# Patient Record
Sex: FEMALE | Race: WHITE | Employment: FULL TIME | URBAN - METROPOLITAN AREA
[De-identification: names, ages, dates, MRNs, and addresses within clinical notes are randomized per-mention and may not be internally consistent; named-entity substitution may affect disease eponyms.]

---

## 2017-07-10 ENCOUNTER — OFFICE VISIT (OUTPATIENT)
Dept: OBGYN CLINIC | Age: 22
End: 2017-07-10

## 2017-07-10 DIAGNOSIS — N89.8 VAGINAL ODOR: ICD-10-CM

## 2017-07-10 DIAGNOSIS — Z34.81 SUPERVISION OF NORMAL INTRAUTERINE PREGNANCY IN MULTIGRAVIDA IN FIRST TRIMESTER: Primary | ICD-10-CM

## 2017-07-10 DIAGNOSIS — O36.80X0 ENCOUNTER TO DETERMINE FETAL VIABILITY OF PREGNANCY, NOT APPLICABLE OR UNSPECIFIED FETUS: Primary | ICD-10-CM

## 2017-07-10 LAB
ANTIBODY SCREEN, EXTERNAL: NEGATIVE
CHLAMYDIA, EXTERNAL: NEGATIVE
CYSTIC FIBROSIS, EXTERNAL: NEGATIVE
HBSAG, EXTERNAL: NEGATIVE
HCT, EXTERNAL: 35.1
HGB, EXTERNAL: 11.1
HIV, EXTERNAL: NORMAL
N. GONORRHEA, EXTERNAL: NEGATIVE
PAP SMEAR, EXTERNAL: NEGATIVE
PLATELET CNT,   EXTERNAL: 303
RUBELLA, EXTERNAL: NORMAL
T. PALLIDUM, EXTERNAL: NEGATIVE
TSH SERPL-ACNC: 1.36 M[IU]/L
URINALYSIS, EXTERNAL: NEGATIVE

## 2017-07-10 RX ORDER — RANITIDINE 150 MG/1
150 TABLET, FILM COATED ORAL 2 TIMES DAILY
COMMUNITY
End: 2020-07-14

## 2017-07-10 RX ORDER — BUTALBITAL, ACETAMINOPHEN AND CAFFEINE 50; 325; 40 MG/1; MG/1; MG/1
1 TABLET ORAL
Qty: 20 TAB | Refills: 2 | Status: SHIPPED | OUTPATIENT
Start: 2017-07-10 | End: 2020-07-14

## 2017-07-10 RX ORDER — ACETAMINOPHEN 325 MG/1
TABLET ORAL
COMMUNITY

## 2017-07-10 RX ORDER — CALCIUM CARBONATE 200(500)MG
1 TABLET,CHEWABLE ORAL DAILY
COMMUNITY

## 2017-07-10 NOTE — MR AVS SNAPSHOT
Visit Information Date & Time Provider Department Dept. Phone Encounter #  
 7/10/2017 10:00 AM Betty Call [address] OB-GYN [phone] [id number] Upcoming Health Maintenance Date Due  
 HPV AGE 9Y-34Y (1 of 3 - Female 3 Dose Series) 4/16/2005 PAP AKA CERVICAL CYTOLOGY 4/16/2015 INFLUENZA AGE 9 TO ADULT 8/1/2017 Allergies as of 7/10/2017  Review Complete On: 7/10/2017 By: Jake Cosme MD  
 No Known Allergies Current Immunizations  Never Reviewed Name Date MMR 8/8/2014 11:41 AM  
  
 Not reviewed this visit Vitals LMP OB Status Smoking Status 05/17/2017 (Exact Date) Having regular periods Current Every Day Smoker Preferred Pharmacy Pharmacy Name Phone [address] [facility] AT [address] [place] [address]. [phone] Your Updated Medication List  
  
   
This list is accurate as of: 7/10/17 12:21 PM.  Always use your most recent med list.  
  
  
  
  
 amitriptyline 25 mg tablet Commonly known as:  ELAVIL Take 1 tablet by mouth nightly. calcium carbonate 200 mg calcium (500 mg) Chew Commonly known as:  TUMS Take 1 Tab by mouth daily. Diclofenac Potassium 50 mg Pwpk Commonly known as:  CAMBIA  
1 at HA onset and repeat in 2 hours x 1 prn  Max 2 in 24 hours  
  
 ibuprofen 800 mg tablet Commonly known as:  MOTRIN Take 1 Tab by mouth every eight (8) hours as needed for Pain.  
  
 levothyroxine 25 mcg tablet Commonly known as:  SYNTHROID Take  by mouth Daily (before breakfast). magnesium oxide 400 mg tablet Commonly known as:  MAG-OX Take 1 Tab by mouth two (2) times a day. PNV66-Iron Fumarate-FA-DSS-DHA 26-1.2- mg Cap Take  by mouth. TYLENOL 325 mg tablet Generic drug:  acetaminophen Take  by mouth every four (4) hours as needed for Pain. ZANTAC 150 mg tablet Generic drug:  raNITIdine Take 150 mg by mouth two (2) times a day. Patient Instructions Weeks 6 to 10 of Your Pregnancy: Care Instructions Your Care Instructions Congratulations on your pregnancy. This is an exciting and important time for you. During the first 6 to 10 weeks of your pregnancy, your body goes through many changes. Your baby grows very fast, even though you cannot feel it yet. You may start to notice that you feel different, both in your body and your emotions. Because each woman's pregnancy is unique, there is no right way to feel. You may feel the healthiest you have ever been, or you may feel tired or sick to your stomach (\"morning sickness\"). These early weeks are a time to make healthy choices and to eat the best foods for you and your baby. This care sheet will give you some ideas. This is also a good time to think about birth defects testing. These are tests done during pregnancy to look for possible problems with the baby. First trimester tests for birth defects can be done between 8 and 17 weeks of pregnancy, depending on the test. Talk with your doctor about what kinds of tests are available. Follow-up care is a key part of your treatment and safety. Be sure to make and go to all appointments, and call your doctor if you are having problems. It's also a good idea to know your test results and keep a list of the medicines you take. How can you care for yourself at home? Eat well · Eat at least 3 meals and 2 healthy snacks every day. Eat fresh, whole foods, including: ¨ 7 or more servings of bread, tortillas, cereal, rice, pasta, or oatmeal. 
¨ 3 or more servings of vegetables, especially leafy green vegetables. ¨ 2 or more servings of fruits. ¨ 3 or more servings of milk, yogurt, or cheese. ¨ 2 or more servings of meat, turkey, chicken, fish, eggs, or dried beans. · Drink plenty of fluids, especially water. Avoid sodas and other sweetened drinks. · Choose foods that have important vitamins for your baby, such as calcium, iron, and folate. ¨ Dairy products, tofu, canned fish with bones, almonds, broccoli, dark leafy greens, corn tortillas, and fortified orange juice are good sources of calcium. ¨ Beef, poultry, liver, spinach, lentils, dried beans, fortified cereals, and dried fruits are rich in iron. ¨ Dark leafy greens, broccoli, asparagus, liver, fortified cereals, orange juice, peanuts, and almonds are good sources of folate. · Avoid foods that could harm your baby. ¨ Do not eat raw or undercooked meat, chicken, or fish (such as sushi or raw oysters). ¨ Do not eat raw eggs or foods that contain raw eggs, such as Caesar dressing. ¨ Do not eat soft cheeses and unpasteurized dairy foods, such as Brie, feta, or blue cheese. ¨ Do not eat fish that contains a lot of mercury, such as shark, swordfish, tilefish, or frank mackerel. Do not eat more than 6 ounces of tuna each week. ¨ Do not eat raw sprouts, especially alfalfa sprouts. ¨ Cut down on caffeine, such as coffee, tea, and cola. Protect yourself and your baby · Do not touch rosalind litter or cat feces. They can cause an infection that could harm your baby. · High body temperature can be harmful to your baby. So if you want to use a sauna or hot tub, be sure to talk to your doctor about how to use it safely. [place] with morning sickness · Sip small amounts of water, juices, or shakes. Try drinking between meals, not with meals. · Eat 5 or 6 small meals a day. Try dry toast or crackers when you first get up, and eat breakfast a little later. · Avoid spicy, greasy, and fatty foods. · When you feel sick, open your windows or go for a short walk to get fresh air. · Try nausea wristbands. These help some women. · Tell your doctor if you think your prenatal vitamins make you sick. Where can you learn more? Go to http://cortney-williams.info/. Enter G112 in the search box to learn more about \"Weeks 6 to 10 of Your Pregnancy: Care Instructions. \" Current as of: March 16, 2017 Content Version: 11.3 © 2392-4406 [employer]. Care instructions adapted under license by [employer]ango (which disclaims liability or warranty for this information). If you have questions about a medical condition or this instruction, always ask your healthcare professional. [place] [address] any warranty or liability for your use of this information. Introducing [facility]! [employer] introduces Cordia patient portal. Now you can access parts of your medical record, email your doctor's office, and request medication refills online. 1. In your internet browser, go to [email or web address] 2. Click on the First Time User? Click Here link in the Sign In box. You will see the New Member Sign Up page. 3. Enter your Cordia Access Code exactly as it appears below. You will not need to use this code after youve completed the sign-up process. If you do not sign up before the expiration date, you must request a new code. · Cordia Access Code: [email or web address] Expires: 10/8/2017 12:21 PM 
 
4. Enter the last four digits of your Social Security Number (xxxx) and Date of Birth (mm/dd/yyyy) as indicated and click Submit. You will be taken to the next sign-up page. 5. Create a Cordia ID. This will be your Cordia login ID and cannot be changed, so think of one that is secure and easy to remember. 6. Create a Cordia password. You can change your password at any time. 7. Enter your Password Reset Question and Answer. This can be used at a later time if you forget your password. 8. Enter your e-mail address. You will receive e-mail notification when new information is available in [address] 9. Click Sign Up. You can now view and download portions of your medical record. 10. Click the Download Summary menu link to download a portable copy of your medical information. If you have questions, please visit the Frequently Asked Questions section of the [employer] website. Remember, [employer] is NOT to be used for urgent needs. For medical emergencies, dial 911. Now available from your iPhone and Android! Please provide this summary of care documentation to your next provider. If you have any questions after today's visit, please call [phone].

## 2017-07-10 NOTE — PATIENT INSTRUCTIONS
Weeks 6 to 10 of Your Pregnancy: Care Instructions  Your Care Instructions    Congratulations on your pregnancy. This is an exciting and important time for you. During the first 6 to 10 weeks of your pregnancy, your body goes through many changes. Your baby grows very fast, even though you cannot feel it yet. You may start to notice that you feel different, both in your body and your emotions. Because each woman's pregnancy is unique, there is no right way to feel. You may feel the healthiest you have ever been, or you may feel tired or sick to your stomach (\"morning sickness\"). These early weeks are a time to make healthy choices and to eat the best foods for you and your baby. This care sheet will give you some ideas. This is also a good time to think about birth defects testing. These are tests done during pregnancy to look for possible problems with the baby. First trimester tests for birth defects can be done between 8 and 17 weeks of pregnancy, depending on the test. Talk with your doctor about what kinds of tests are available. Follow-up care is a key part of your treatment and safety. Be sure to make and go to all appointments, and call your doctor if you are having problems. It's also a good idea to know your test results and keep a list of the medicines you take. How can you care for yourself at home? Eat well  · Eat at least 3 meals and 2 healthy snacks every day. Eat fresh, whole foods, including:  ¨ 7 or more servings of bread, tortillas, cereal, rice, pasta, or oatmeal.  ¨ 3 or more servings of vegetables, especially leafy green vegetables. ¨ 2 or more servings of fruits. ¨ 3 or more servings of milk, yogurt, or cheese. ¨ 2 or more servings of meat, turkey, chicken, fish, eggs, or dried beans. · Drink plenty of fluids, especially water. Avoid sodas and other sweetened drinks. · Choose foods that have important vitamins for your baby, such as calcium, iron, and folate.   ¨ Dairy products, tofu, canned fish with bones, almonds, broccoli, dark leafy greens, corn tortillas, and fortified orange juice are good sources of calcium. ¨ Beef, poultry, liver, spinach, lentils, dried beans, fortified cereals, and dried fruits are rich in iron. ¨ Dark leafy greens, broccoli, asparagus, liver, fortified cereals, orange juice, peanuts, and almonds are good sources of folate. · Avoid foods that could harm your baby. ¨ Do not eat raw or undercooked meat, chicken, or fish (such as sushi or raw oysters). ¨ Do not eat raw eggs or foods that contain raw eggs, such as Caesar dressing. ¨ Do not eat soft cheeses and unpasteurized dairy foods, such as Brie, feta, or blue cheese. ¨ Do not eat fish that contains a lot of mercury, such as shark, swordfish, tilefish, or frank mackerel. Do not eat more than 6 ounces of tuna each week. ¨ Do not eat raw sprouts, especially alfalfa sprouts. ¨ Cut down on caffeine, such as coffee, tea, and cola. Protect yourself and your baby  · Do not touch rosalind litter or cat feces. They can cause an infection that could harm your baby. · High body temperature can be harmful to your baby. So if you want to use a sauna or hot tub, be sure to talk to your doctor about how to use it safely. [place] with morning sickness  · Sip small amounts of water, juices, or shakes. Try drinking between meals, not with meals. · Eat 5 or 6 small meals a day. Try dry toast or crackers when you first get up, and eat breakfast a little later. · Avoid spicy, greasy, and fatty foods. · When you feel sick, open your windows or go for a short walk to get fresh air. · Try nausea wristbands. These help some women. · Tell your doctor if you think your prenatal vitamins make you sick. Where can you learn more? Go to http://olivier.info/. Enter G112 in the search box to learn more about \"Weeks 6 to 10 of Your Pregnancy: Care Instructions. \"  Current as of: March 16, 2017  Content Version: 11.3  © 1714-7014 [employer]. Care instructions adapted under license by [employer] (which disclaims liability or warranty for this information). If you have questions about a medical condition or this instruction, always ask your healthcare professional. [address] any warranty or liability for your use of this information.

## 2017-07-10 NOTE — PROGRESS NOTES
TV ULTRASOUND PERFORMED  A SINGLE VIABLE 7W3D WITH GARY OF 2018 IUP IS SEEN WITH NORMAL CARDIAC RHYTHM. GESTATIONAL AGE BASED ON ULTRASOUND. A NORMAL YOLK SAC IS SEEN. RIGHT OVARY APPEAR WITHIN NORMAL LIMITS. LEFT OVARY APPEARS WITHING NORMAL LIMITS. NO FREE FLUID IS SEEN IN THE CDS. Initial OB Visit    Roger Moeller is a 20 y.o.  at 7 weeks 3 days by 7 wk RIGOBERTO ultrasound for initial OB visit, overall doing well. +fatigue, mild N/V, occasional palpitations, occasional mild cramping. Otherwise doing well. Pt is smoker. Boyfriend with patient today, pt works as , ~3 yo daughter \"Rashaad\"    Pregnancy symptoms:  -N/V: yes, mild  -breast tenderness: yes  -fatigue: yes  -cramping: yes, mild  -weight change: yes (lost a few pounds), decreased appetite, increased activity  -Vaginal bleeding: no  -Fetal Movement: n/a    Ob/Gyn Hx:  G 2 P1- tsvd x1, Anthony Sa, female \"Rashaad\"  EDC- 18  LMP- Date: 17 unsure, approximate  Menstrual pattern prior to conception: Regular monthly  Contraception at time of conception: denies  Date of 1st positive UPT: 17  STI- ?trichomonas in last pregnancy  ? SA- male partner    Health maintenance:  Pap- unsure of date, normal  Gardasil- three doses received  Mammo-n/a    Substance history: negative for alcohol, and street drugs. +smoker (cut back to 8 cigarettes per day)  +THC          Exposure history: There ARE cats in the home. The patient was instructed to not change the cat litter. She admits close contact with children on a regular basis. She has had chicken pox or the vaccine in the past.   Patient denies issues with domestic violence. Genetic Screening/Teratology Counseling: (Includes patient, baby's father, or anyone in either family with:)  3.  Patient's age >/= 28 at [facility]?-- no  .   2.   Thalassemia ([facility] [place], [address], or  background): MCV<80?--no.     3.  Neural tube defect (meningomyelocele, spina bifida, anencephaly)?--no.   4.  Congenital heart defect?--YES - VSD (patient)  5. Down syndrome?--no.   6.  Gonsalo-Sachs ([place], [place])?--no.   7.  Canavan's Disease?--no.   8.  Familial Dysautonomia?--no.   9.  Sickle cell disease or trait ()? --no   The patient has not been tested for sickle trait  10. Hemophilia or other blood disorders?--no. 11.  Muscular dystrophy?--no. 12.  Cystic fibrosis?--no. 13.  Hamburg's Chorea?--no. 14.  Mental retardation/autism (if yes was person tested for Fragile X)?--no. 15.  Other inherited genetic or chromosomal disorder?--no. 12.  Maternal metabolic disorder (DM, PKU, etc)?--no. 17.  Patient or FOB with a child with a birth defect not listed above?--no.  17a. Patient or FOB with a birth defect themselves?--no. 18.  Recurrent pregnancy loss, or stillbirth?--no. 19.  Any medications since LMP other than prenatal vitamins (include vitamins, supplements, OTC meds, drugs, alcohol)?--no. 20.  Any other genetic/environmental exposure to discuss?--no. Infection History:  1. Lives with someone with TB or TB exposed?--no.   2.  Patient or partner has history of genital herpes?--no.  3.  Rash or viral illness since LMP?--no.    4.  History of STD (GC, CT, HPV, syphilis, HIV)? --no   5. Other: OTHER?   No travel to [facility]    Past Medical History:   Diagnosis Date    Abnormal Pap smear     2013    Anxiety     Fatigue     Heart abnormality     Memory loss     Neurological disorder     migraines    Other ill-defined conditions     Bladder reflux    Ringing in ears     Thyroid activity decreased     Thyroid disease     hypothyroidism in first pregnancy    VSD (ventricular septal defect)      Past Surgical History:   Procedure Laterality Date    CARDIAC SURG PROCEDURE UNLIST      VSD repair       Family History   Problem Relation Age of Onset    Cancer Maternal Grandmother      lung cancer    Diabetes Maternal Grandmother     Hypertension Maternal Grandmother     Breast Cancer Other      Social History     Social History    Marital status: SINGLE     Spouse name: N/A    Number of children: N/A    Years of education: N/A     Occupational History    Not on file. Social History Main Topics    Smoking status: Current Every Day Smoker     Packs/day: 0.25    Smokeless tobacco: Current User    Alcohol use No    Drug use: No    Sexual activity: Yes     Partners: Male     Other Topics Concern    Not on file     Social History Narrative    ** Merged History Encounter **            Current Outpatient Prescriptions   Medication Sig Dispense Refill    acetaminophen (TYLENOL) 325 mg tablet Take  by mouth every four (4) hours as needed for Pain.  raNITIdine (ZANTAC) 150 mg tablet Take 150 mg by mouth two (2) times a day.  calcium carbonate (TUMS) 200 mg calcium (500 mg) chew Take 1 Tab by mouth daily.  PNV66-Iron Fumarate-FA-DSS-DHA 26-1.2- mg cap Take  by mouth.  Diclofenac Potassium (CAMBIA) 50 mg pwpk 1 at HA onset and repeat in 2 hours x 1 prn  Max 2 in 24 hours 4 packet 3    amitriptyline (ELAVIL) 25 mg tablet Take 1 tablet by mouth nightly. 30 tablet 3    ibuprofen (MOTRIN) 800 mg tablet Take 1 Tab by mouth every eight (8) hours as needed for Pain. 30 Tab 1    levothyroxine (SYNTHROID) 25 mcg tablet Take  by mouth Daily (before breakfast).  magnesium oxide (MAG-OX) 400 mg tablet Take 1 Tab by mouth two (2) times a day.  60 Tab 3     No Known Allergies      Review of Systems - History obtained from the patient  Constitutional: negative for fever, night sweats, +3lb wt loss, +fatigue  HEENT: negative for hearing loss, earache, congestion, snoring, sorethroat  CV: negative for chest pain, palpitations, edema, +occasional palpitations/heart racing  Resp: negative for cough, shortness of breath, wheezing  GI: negative for change in bowel habits, abdominal pain, black or bloody stools, +mild N/V, decreased appetite  : negative for frequency, dysuria, hematuria, vaginal discharge, +mild cramping  MSK: negative for back pain, joint pain, muscle pain  Breast: negative for breast lumps, nipple discharge, galactorrhea  Skin :negative for itching, rash, hives  Neuro: negative for dizziness, confusion, weakness, +intermittent HAs  Psych: negative for anxiety, depression, change in mood  Heme/lymph: negative for bleeding, bruising, pallor    Physical Exam    Visit Vitals    /68 (BP 1 Location: Right arm, BP Patient Position: Sitting)    Resp 18    Ht 5' 1\" (1.549 m)    Wt 127 lb (57.6 kg)    LMP 05/17/2017 (Exact Date)    BMI 24 kg/m2       Constitutional  · Appearance: well-nourished, well developed, alert, in no acute distress, smells of smoke    HENT  · Head and Face: appears normal    Neck  · Inspection/Palpation: normal appearance, no masses or tenderness  · Lymph Nodes: no lymphadenopathy present  · Thyroid: gland size normal, nontender, no nodules or masses present on palpation    Chest  · Respiratory Effort: non-labored breathing  · Auscultation: CTAB, normal breath sounds    Cardiovascular  · Heart:  · Auscultation: regular rate and rhythm, ?murmur, well-healed midsternal scar from prior VSD repair at age 9mo  · Extremities: no peripheral edema    Breasts  · Inspection of Breasts: breasts symmetrical, no skin changes, no discharge present, nipple appearance normal, no skin retraction present  · Palpation of Breasts and Axillae: no masses present on palpation, no breast tenderness  · Axillary Lymph Nodes: no lymphadenopathy present    Gastrointestinal  · Abdominal Examination: abdomen non-tender to palpation, normal bowel sounds, no masses present  · Liver and spleen: no hepatomegaly present, spleen not palpable  · Hernias: no hernias identified    Genitourinary  · External Genitalia: normal appearance for age, no discharge present, no tenderness present, no inflammatory lesions present, no masses present, no atrophy present  · Vagina: normal vaginal vault without central or paravaginal defects, no inflammatory lesions present, no masses present, +moderate amount of frothy gray malodorous vaginal discharge  · Bladder: non-tender to palpation  · Urethra: appears normal  · Cervix: normal, no cervicitis, no CMT  · Uterus: approximately 7 week sized, non-tender  · Adnexa: no adnexal tenderness present, no appreciable adnexal masses present  · Perineum: perineum within normal limits, no evidence of trauma, no rashes or skin lesions present    Skin  · General Inspection: no rash, no lesions identified    Neurologic/Psychiatric  · Mental Status:  · Orientation: grossly oriented to person, place and time  · Mood and Affect: mood normal, affect appropriate      Assessment/Plan:  20 y.o.  at 7w3d by 7 wk RIGOBERTO ultrasound presenting for initial OB visit. Overall doing well. IUP: confirmed on ultrasound today, FHTs 150s, EDC 17  -anatomy scan 18-20 wks  -daily PNV   -flu vaccine next flu season  -tdap at 28 wks  -counseled on nutrition and proper weight gain in pregnancy as well as foods to avoid  -discussed other high yield topics including appropriate exercise levels, sexual activity, toxoplasmosis precautions, toxin avoidance, travel advice (including zika travel warnings)  -screen for DV neg    Genetic screening/diagnostic testing:  -counselled on screening options including CF, SMA, fragile X testing, 1st trimester screen/NT --> MSAFP, 2nd trimester tetra screen, NIPT, CVS, amniocentesis, etc.  -desires CF testing  -considering options for NIPT/1st tri/tetra, will read more about this and decide at next visit (had a friend with baby affected by trisomy 15)    PNL: A pos  -new OB labs today  -pap today  -Nuswab Plus (given malodorous frothy vaginal discharge)  -urine cx     PMH:  -mild N/V of pregnancy - advise small freq meals, take PNV with food later in day, jordan, etc. Unisom/B6 prn.    -GERD - tums, zantac prn  -h/o VSD - surgically repaired at age 6mo, no current issues (occsaional heart racing/palpitations, but denies CP, SOB or other symptoms), was previously on metoprolol for irregularly fast heart beat but has been off of this for many years, echocardiograms have been wnl, was following with [facility] --> recommended follow up with her cardiologist --> will need to request records  -hypothyroid - in prior pregnancy only, recently had thyroid function checked within last 6 months and was normal - will check TSH today  -migraines - since 2007, intermittent HAs this pregnancy, tylenol prn, previously on fioricet, ran out of Rx, [facility]ill [facility] [address]), she is off of diclofenac/ibuprofen/amitriptyline which she has taken outside of pregnancy)  -mild anxiety - stable, no current meds  -substance abuse - tobacco, \"vaping\", THC --> counseled on cessation and risks to baby (growth restriction, PTL, etc)    PP plans:  -feeding? breast feeding, tried with last pregnancy x 2 weeks, low production, but wants to try again  -contraception? tbd  -circ (if male)?  tbd - wants to find out gender  -NCB desired, epidural with last pegnancy, would like to avoid this time    RTC: 1 month, or sooner prn for problems or concerns  -cramping, pain, bleeding precautions reviewed  -handouts and instructions provided    Shyam Singh MD  7/10/2017  12:17 PM

## 2017-07-11 VITALS
WEIGHT: 127 LBS | HEIGHT: 61 IN | SYSTOLIC BLOOD PRESSURE: 110 MMHG | BODY MASS INDEX: 23.98 KG/M2 | DIASTOLIC BLOOD PRESSURE: 68 MMHG | RESPIRATION RATE: 18 BRPM

## 2017-07-11 LAB
CYTOLOGIST CVX/VAG CYTO: NORMAL
CYTOLOGY CVX/VAG DOC THIN PREP: NORMAL
DX ICD CODE: NORMAL
LABCORP, 190119: NORMAL
Lab: NORMAL
OTHER STN SPEC: NORMAL
PATH REPORT.FINAL DX SPEC: NORMAL
STAT OF ADQ CVX/VAG CYTO-IMP: NORMAL

## 2017-07-12 LAB
ABO GROUP BLD: NORMAL
BACTERIA UR CULT: NORMAL
BLD GP AB SCN SERPL QL: NEGATIVE
ERYTHROCYTE [DISTWIDTH] IN BLOOD BY AUTOMATED COUNT: 13.9 % (ref 12.3–15.4)
HBV SURFACE AG SERPL QL IA: NEGATIVE
HCT VFR BLD AUTO: 35.1 % (ref 34–46.6)
HGB A MFR BLD: 97.6 % (ref 94–98)
HGB A2 MFR BLD COLUMN CHROM: 2.4 % (ref 0.7–3.1)
HGB BLD-MCNC: 11.1 G/DL (ref 11.1–15.9)
HGB C MFR BLD: 0 %
HGB F MFR BLD: 0 % (ref 0–2)
HGB FRACT BLD-IMP: NORMAL
HGB S BLD QL SOLY: NEGATIVE
HGB S MFR BLD: 0 %
HIV 1+2 AB+HIV1 P24 AG SERPL QL IA: NON REACTIVE
MCH RBC QN AUTO: 27.9 PG (ref 26.6–33)
MCHC RBC AUTO-ENTMCNC: 31.6 G/DL (ref 31.5–35.7)
MCV RBC AUTO: 88 FL (ref 79–97)
PLATELET # BLD AUTO: 303 X10E3/UL (ref 150–379)
RBC # BLD AUTO: 3.98 X10E6/UL (ref 3.77–5.28)
RH BLD: POSITIVE
RUBV IGG SERPL IA-ACNC: 1.31 INDEX
T PALLIDUM AB SER QL IA: NEGATIVE
T4 FREE SERPL-MCNC: 0.91 NG/DL (ref 0.82–1.77)
TSH SERPL DL<=0.005 MIU/L-ACNC: 1.36 UIU/ML (ref 0.45–4.5)
WBC # BLD AUTO: 6.6 X10E3/UL (ref 3.4–10.8)

## 2017-07-13 LAB
A VAGINAE DNA VAG QL NAA+PROBE: ABNORMAL SCORE
BVAB2 DNA VAG QL NAA+PROBE: ABNORMAL SCORE
C ALBICANS DNA VAG QL NAA+PROBE: NEGATIVE
C GLABRATA DNA VAG QL NAA+PROBE: NEGATIVE
C TRACH RRNA SPEC QL NAA+PROBE: NEGATIVE
CFTR MUT ANL BLD/T: NORMAL
GENE DIS ANL CARRIER INTERP-IMP: NORMAL
MEGA1 DNA VAG QL NAA+PROBE: ABNORMAL SCORE
N GONORRHOEA RRNA SPEC QL NAA+PROBE: NEGATIVE
T VAGINALIS RRNA SPEC QL NAA+PROBE: NEGATIVE

## 2017-07-13 RX ORDER — METRONIDAZOLE 500 MG/1
500 TABLET ORAL 2 TIMES DAILY
Qty: 14 TAB | Refills: 0 | Status: SHIPPED | OUTPATIENT
Start: 2017-07-13 | End: 2017-07-20

## 2017-07-13 NOTE — PROGRESS NOTES
+BV --> please inform patient and send Rx for flagyl 500mg BID x 7 days to pts pharmacy. Thanks.     Orvis Osgood, MD  7/13/2017  11:20 AM

## 2017-07-13 NOTE — PROGRESS NOTES
Patient informed of results and that Flagyl will be sent to pharmacy for treatment; patient verbalized understanding.

## 2017-07-21 RX ORDER — METRONIDAZOLE 500 MG/1
500 TABLET ORAL 2 TIMES DAILY
Qty: 14 TAB | Refills: 0 | Status: SHIPPED | OUTPATIENT
Start: 2017-07-21 | End: 2017-07-28

## 2017-08-07 ENCOUNTER — ROUTINE PRENATAL (OUTPATIENT)
Dept: OBGYN CLINIC | Age: 22
End: 2017-08-07

## 2017-08-07 VITALS
BODY MASS INDEX: 24.39 KG/M2 | HEIGHT: 61 IN | RESPIRATION RATE: 18 BRPM | WEIGHT: 129.2 LBS | SYSTOLIC BLOOD PRESSURE: 114 MMHG | DIASTOLIC BLOOD PRESSURE: 60 MMHG

## 2017-08-07 DIAGNOSIS — Q21.0 VSD (VENTRICULAR SEPTAL DEFECT): ICD-10-CM

## 2017-08-07 DIAGNOSIS — Z3A.11 11 WEEKS GESTATION OF PREGNANCY: Primary | ICD-10-CM

## 2017-08-07 NOTE — PROGRESS NOTES
19 yo  at [address] by 7 wk RIGOBERTO ultrasound presenting for Silke Isaac 9038 visit.      The following concerns today:  -frequent headaches - minimal relief with fioricet  -Nausea and vomiting (1-2x daily), weight stable  -Occasional mild cramping, no vaginal bleeding    IUP: +FHTs  -anatomy scan 18-20 wks  -daily PNV   -flu vaccine next flu season   -tdap at 28 wks  -reviewed pain/bleeding precautions     Genetic screening/diagnostic testing:  -counselled on screening options including CF, SMA, fragile X testing, 1st trimester screen/NT --> MSAFP, 2nd trimester tetra screen, NIPT, CVS, amniocentesis, etc.  -desires [facility] --> order form and box provided today (had a friend with baby affected by trisomy 15)     PNL: A pos / ABSC neg / AA / 11.1, 303 / HIV, hepB, syph, rub, GC, Chl all neg / pap NILM 2017 / CF neg / UG varghese      PMH:  -mild N/V of pregnancy - advise small freq meals, take PNV with food later in day, jordan, etc. Advised pt start Unisom/B6.   -GERD - tums, zantac prn  -h/o VSD - surgically repaired at age 6mo, no current issues (occsaional heart racing/palpitations, but denies CP, SOB or other symptoms), was previously on metoprolol for irregularly fast heart beat but has been off of this for many years, echocardiograms have been wnl, was following with [facility] --> recommended follow up with her cardiologist (new referral provided today, as insurance changed)--> will need to request records  -hypothyroid - in prior pregnancy only, TFTs wnl in current pregnancy (TSH 1.36, free T4 0.91)  -migraines - since , intermittent HAs this pregnancy, tylenol prn, previously on fioricet, ran out of Rx, [facility] [address] she is off of diclofenac/ibuprofen/amitriptyline which she has taken outside of pregnancy), is unable to take imitrex due to stroke like symptoms while taking this medication, advised pt take 2 fioricets for severe headaches prn if no relief from 1, encouraged good hydration  -mild anxiety - stable, no current meds  -substance abuse - tobacco, \"vaping\", THC --> counseled on cessation and risks to baby (growth restriction, PTL, etc)  -s/p tx for BV     PP plans:  -feeding? breast feeding, tried with last pregnancy x 2 weeks, low production, but wants to try again  -contraception? tbd  -circ (if male)?  tbd - wants to find out gender  -NCB desired, epidural with last pegnancy, would like to avoid this time     RTC: 1 month, or sooner prtamika Lemons MD  8/7/2017  12:10 PM

## 2017-08-07 NOTE — PROGRESS NOTES
Chief Complaint   Patient presents with    Routine Prenatal Visit     Patient complains of migraines unrelieved by Fioricet, nausea and vomiting (1-2 times daily), and sporadic abdominal cramping (especially prior to voiding); she states she stays well hydrated and denies other complaints.

## 2017-08-09 ENCOUNTER — TELEPHONE (OUTPATIENT)
Dept: OBGYN CLINIC | Age: 22
End: 2017-08-09

## 2017-09-05 ENCOUNTER — ROUTINE PRENATAL (OUTPATIENT)
Dept: OBGYN CLINIC | Age: 22
End: 2017-09-05

## 2017-09-05 VITALS
RESPIRATION RATE: 19 BRPM | SYSTOLIC BLOOD PRESSURE: 124 MMHG | WEIGHT: 131 LBS | DIASTOLIC BLOOD PRESSURE: 68 MMHG | BODY MASS INDEX: 24.73 KG/M2 | HEIGHT: 61 IN

## 2017-09-05 DIAGNOSIS — Z34.82 PRENATAL CARE, SUBSEQUENT PREGNANCY, SECOND TRIMESTER: Primary | ICD-10-CM

## 2017-09-05 DIAGNOSIS — Z3A.15 15 WEEKS GESTATION OF PREGNANCY: ICD-10-CM

## 2017-09-05 NOTE — PROGRESS NOTES
23 yo  at 15w4d by 7 wk RIGOBERTO ultrasound presenting for GUERA visit. Overall doing well. Still with mild nausea and occasional emesis, but has not yet started doxylamine/B6. Adequate PO intake, weight increased 2lb from prior visit. Occasional HAs. Is getting relief from fioricet. Denies cramping or vaginal bleeding. She does note she had episode of dizziness and \"fell out\" 2 weeks ago. EMS came to house checked her vitals, she felt better so never went to ER. She denies any associated symptoms. Thinks she was dehydrated as she hadn't eaten or drunk much that day. No CP/SOB/palpitations. No head trauma.  No other problems.      IUP: +FHTs, having BOY  -anatomy scan 18-20 wks (next visit)  -daily PNV   -flu vaccine next flu season   -tdap at 28 wks  -reviewed pain/bleeding precautions      Genetic screening/diagnostic testing: [facility] low risk male XY      PNL: A pos / ABSC neg / AA / 11.1, 303 / HIV, hepB, syph, rub, GC, Chl all neg / pap NILM 2017 / CF neg / UG varghese      PMH:  -mild N/V of pregnancy - advise small freq meals, take PNV with food later in day, jordan, etc. Advised pt start Unisom/B6.   -GERD - tums, zantac prn  -h/o VSD - surgically repaired at age 6mo, no current issues (occsaional heart racing/palpitations, but denies CP, SOB or other symptoms), was previously on metoprolol for irregularly fast heart beat but has been off of this for many years, echocardiograms have been wnl, was following with [facility] --> has follow up appt scheduled tomorrow --> will need to request records  -hypothyroid - in prior pregnancy only, TFTs wnl in current pregnancy (TSH 1.36, free T4 0.91)  -migraines - since , controlled on fioricet, she is off of diclofenac/ibuprofen/amitriptyline which she has taken outside of pregnancy), is unable to take imitrex due to stroke like symptoms while taking this medication, encouraged good hydration  -mild anxiety - stable, no current meds  -substance abuse - tobacco, \"vaping\", THC --> counseled on cessation and risks to baby (growth restriction, PTL, etc)  -s/p tx for BV      PP plans:  -feeding? breast feeding, tried with last pregnancy x 2 weeks, low production, but wants to try again  -contraception? tbd  -circ (if male)?  tbd   -NCB desired, epidural with last pegnancy, would like to avoid this time      RTC: 1 month, or sooner prn     Amelia Encinas MD  9/5/2017  10:58 AM

## 2017-10-03 ENCOUNTER — ROUTINE PRENATAL (OUTPATIENT)
Dept: OBGYN CLINIC | Age: 22
End: 2017-10-03

## 2017-10-03 VITALS
RESPIRATION RATE: 18 BRPM | WEIGHT: 135 LBS | SYSTOLIC BLOOD PRESSURE: 112 MMHG | DIASTOLIC BLOOD PRESSURE: 56 MMHG | BODY MASS INDEX: 25.51 KG/M2

## 2017-10-03 DIAGNOSIS — Z3A.19 19 WEEKS GESTATION OF PREGNANCY: Primary | ICD-10-CM

## 2017-10-03 NOTE — PROGRESS NOTES
Chief Complaint   Patient presents with    Routine Prenatal Visit     FETAL SURVEY  A SINGLE VIABLE IUP AT 19W4D IS SEEN. FETAL CARDIAC MOTION OBSERVED. FETAL ANATOMY WAS WELL VISUALIZED AND APPEARS WNL. NO ABNORMALITIES WERE SEEN ON TODAYS EXAM.  APPROPRIATE GROWTH MEASURED. SIZE = DATES. LESLEE, PLACENTA AND CERVIX APPEAR WITHIN NORMAL LIMITS. GENDER: MALE    23 yo  at 19w4d by 7 wk RIGOBERTO ultrasound presenting for GUERA visit.       IUP: +FHTs, +FM, s=d, anatomy scan 10/3 --> normal MALE anatomy and growth, posterior placenta, normal cervix, cephalic  -daily PNV   -flu vaccine declines today  -tdap at 28 wks  -reviewed pain/bleeding precautions      Genetic screening/diagnostic testing: [facility] low risk male XY      PNL: A pos / ABSC neg / AA / 11.1, 303 / HIV, hepB, syph, rub, GC, Chl all neg / pap NILM 2017 / CF neg / UG varghese      PMH:  -mild N/V of pregnancy - improved, advise small freq meals, take PNV with food later in day, jordan, Unisom/B6.   -GERD - tums, zantac prn  -h/o VSD - surgically repaired at age 6mo, no current issues (occsaional heart racing/palpitations, but denies CP, SOB or other symptoms), was previously on metoprolol for irregularly fast heart beat but has been off of this for many years, echocardiograms have been wnl, had appointment with [facility], reports everything checked out normally--> records release form signed today.   -hypothyroid - in prior pregnancy only, TFTs wnl in current pregnancy (TSH 1.36, free T4 0.91)  -migraines - since , controlled on fioricet, she is off of diclofenac/ibuprofen/amitriptyline which she has taken outside of pregnancy), is unable to take imitrex due to stroke like symptoms while taking this medication, encouraged good hydration  -mild anxiety - stable, no current meds  -substance abuse - tobacco, \"vaping\", THC --> counseled on cessation and risks to baby (growth restriction, PTL, etc)  -s/p tx for BV      PP plans:  -feeding? breast feeding, tried with last pregnancy x 2 weeks, low production, but wants to try again  -contraception? tbd  -circ (if male)?  tbd   -NCB desired, epidural with last pegnancy, would like to avoid this time      RTC: 1 month with CNMs, or sooner prn     Benedict Vega MD  10/3/2017  11:46 AM

## 2017-10-31 ENCOUNTER — ROUTINE PRENATAL (OUTPATIENT)
Dept: OBGYN CLINIC | Age: 22
End: 2017-10-31

## 2017-10-31 VITALS
BODY MASS INDEX: 26.24 KG/M2 | DIASTOLIC BLOOD PRESSURE: 70 MMHG | WEIGHT: 139 LBS | SYSTOLIC BLOOD PRESSURE: 120 MMHG | HEIGHT: 61 IN

## 2017-10-31 DIAGNOSIS — Z3A.23 23 WEEKS GESTATION OF PREGNANCY: ICD-10-CM

## 2017-10-31 NOTE — PROGRESS NOTES
Problem List  Date Reviewed: 10/3/2017          Codes Class Noted    Pregnancy ICD-10-CM: Z34.90  ICD-9-CM: V22.2  8/6/2014    Overview Signed 10/31/2017 10:47 AM by Hazel Gonzalez LPN     Boy!   Smoker

## 2017-10-31 NOTE — PROGRESS NOTES
Pain described as RLP on Right with \"baby more on that side\". Denies urinary s/sx. GFM. Denies PTL s/sx. Smokes 1 ppd, Cessation encouraged and rev Risks of continued heavy smoking during pregnancy including IUFD. Plan Glucola nxt visit. States had to do 3 hr GTT with daughter.

## 2017-10-31 NOTE — PATIENT INSTRUCTIONS
Weeks 22 to 26 of Your Pregnancy: Care Instructions  Your Care Instructions    As you enter your 7th month of pregnancy at week 26, your baby's lungs are growing stronger and getting ready to breathe. You may notice that your baby responds to the sound of your or your partner's voice. You may also notice that your baby does less turning and twisting and more squirming or jerking. Jerking often means that your baby has the hiccups. Hiccups are perfectly normal and are only temporary. You may want to think about attending a childbirth preparation class. This is also a good time to start thinking about whether you want to have pain medicine during labor. Most pregnant women are tested for gestational diabetes between weeks 25 and 28. Gestational diabetes occurs when your blood sugar level gets too high when you're pregnant. The test is important, because you can have gestational diabetes and not know it. But the condition can cause problems for your baby. Follow-up care is a key part of your treatment and safety. Be sure to make and go to all appointments, and call your doctor if you are having problems. It's also a good idea to know your test results and keep a list of the medicines you take. How can you care for yourself at home? Ease discomfort from your baby's kicking  · Change your position. Sometimes this will cause your baby to change position too. · Take a deep breath while you raise your arm over your head. Then breathe out while you drop your arm. Do Kegel exercises to prevent urine from leaking  · You can do Kegel exercises while you stand or sit. ¨ Squeeze the same muscles you would use to stop your urine. Your belly and thighs should not move. ¨ Hold the squeeze for 3 seconds, and then relax for 3 seconds. ¨ Start with 3 seconds. Then add 1 second each week until you are able to squeeze for 10 seconds. ¨ Repeat the exercise 10 to 15 times for each session.  Do three or more sessions each day.  Ease or reduce swelling in your feet, ankles, hands, and fingers  · If your fingers are puffy, take off your rings. · Do not eat high-salt foods, such as potato chips. · Prop up your feet on a stool or couch as much as possible. Sleep with pillows under your feet. · Do not stand for long periods of time or wear tight shoes. · Wear support stockings. Where can you learn more? Go to http://cortney-williams.info/. Enter G264 in the search box to learn more about \"Weeks 22 to 26 of Your Pregnancy: Care Instructions. \"  Current as of: March 16, 2017  Content Version: 11.4  © 2531-4143 [employer], [employer]. Care instructions adapted under license by [employer] (which disclaims liability or warranty for this information). If you have questions about a medical condition or this instruction, always ask your healthcare professional. [place] [address] any warranty or liability for your use of this information.

## 2017-11-27 ENCOUNTER — ROUTINE PRENATAL (OUTPATIENT)
Dept: OBGYN CLINIC | Age: 22
End: 2017-11-27

## 2017-11-27 VITALS
SYSTOLIC BLOOD PRESSURE: 124 MMHG | WEIGHT: 143.8 LBS | HEIGHT: 61 IN | BODY MASS INDEX: 27.15 KG/M2 | DIASTOLIC BLOOD PRESSURE: 74 MMHG

## 2017-11-27 DIAGNOSIS — Z3A.27 27 WEEKS GESTATION OF PREGNANCY: ICD-10-CM

## 2017-11-27 DIAGNOSIS — Z34.83 ENCOUNTER FOR SUPERVISION OF OTHER NORMAL PREGNANCY IN THIRD TRIMESTER: Primary | ICD-10-CM

## 2017-11-27 NOTE — PATIENT INSTRUCTIONS
Weeks 26 to 30 of Your Pregnancy: Care Instructions  Your Care Instructions    You are now in your last trimester of pregnancy. Your baby is growing rapidly. And you'll probably feel your baby moving around more often. Your doctor may ask you to count your baby's kicks. Your back may ache as your body gets used to your baby's size and length. If you haven't already had the Tdap shot during this pregnancy, talk to your doctor about getting it. It will help protect your  against pertussis infection. During this time, it's important to take care of yourself and pay attention to what your body needs. If you feel sexual, explore ways to be close with your partner that match your comfort and desire. Use the tips provided in this care sheet to find ways to be sexual in your own way. Follow-up care is a key part of your treatment and safety. Be sure to make and go to all appointments, and call your doctor if you are having problems. It's also a good idea to know your test results and keep a list of the medicines you take. How can you care for yourself at home? Take it easy at work  · Take frequent breaks. If possible, stop working when you are tired, and rest during your lunch hour. · Take bathroom breaks every 2 hours. · Change positions often. If you sit for long periods, stand up and walk around. · When you stand for a long time, keep one foot on a low stool with your knee bent. After standing a lot, sit with your feet up. · Avoid fumes, chemicals, and tobacco smoke. Be sexual in your own way  · Having sex during pregnancy is okay, unless your doctor tells you not to. · You may be very interested in sex, or you may have no interest at all. · Your growing belly can make it hard to find a good position during intercourse. [place] and explore. · You may get cramps in your uterus when your partner touches your breasts.   · A back rub may relieve the backache or cramps that sometimes follow orgasm. Learn about  labor  · Watch for signs of  labor. You may be going into labor if:  ¨ You have menstrual-like cramps, with or without nausea. ¨ You have about 4 or more contractions in 20 minutes, or about 8 or more within 1 hour, even after you have had a glass of water and are resting. ¨ You have a low, dull backache that does not go away when you change your position. ¨ You have pain or pressure in your pelvis that comes and goes in a pattern. ¨ You have intestinal cramping or flu-like symptoms, with or without diarrhea. ¨ You notice an increase or change in your vaginal discharge. Discharge may be heavy, mucus-like, watery, or streaked with blood. ¨ Your water breaks. · If you think you have  labor:  ¨ Drink 2 or 3 glasses of water or juice. Not drinking enough fluids can cause contractions. ¨ Stop what you are doing, and empty your bladder. Then lie down on your left side for at least 1 hour. ¨ While lying on your side, find your breast bone. Put your fingers in the soft spot just below it. Move your fingers down toward your belly button to find the top of your uterus. Check to see if it is tight. ¨ Contractions can be weak or strong. Record your contractions for an hour. Time a contraction from the start of one contraction to the start of the next one. ¨ Single or several strong contractions without a pattern are called Otto-Kirby contractions. They are practice contractions but not the start of labor. They often stop if you change what you are doing. ¨ Call your doctor if you have regular contractions. Where can you learn more? Go to http://cortney-williams.info/. Enter Q382 in the search box to learn more about \"Weeks 26 to 30 of Your Pregnancy: Care Instructions. \"  Current as of: 2017  Content Version: 11.4  © 4543-3262 [employer].  Care instructions adapted under license by [employer] (which disclaims liability or warranty for this information). If you have questions about a medical condition or this instruction, always ask your healthcare professional. [place] [address] any warranty or liability for your use of this information.

## 2017-11-27 NOTE — PROGRESS NOTES
Problem List  Date Reviewed: 10/31/2017          Codes Class Noted    Pregnancy ICD-10-CM: Z34.90  ICD-9-CM: V22.2  8/6/2014    Overview Addendum 11/27/2017  9:40 AM by Jihan Garner LPN     [id number]  Boy!   Smoker 1 ppd  Declines Flu Vaccine  Glucola_____  tdap ___

## 2017-11-28 LAB
BASOPHILS # BLD AUTO: 0 X10E3/UL (ref 0–0.2)
BASOPHILS NFR BLD AUTO: 0 %
EOSINOPHIL # BLD AUTO: 0.1 X10E3/UL (ref 0–0.4)
EOSINOPHIL NFR BLD AUTO: 1 %
ERYTHROCYTE [DISTWIDTH] IN BLOOD BY AUTOMATED COUNT: 14.2 % (ref 12.3–15.4)
GLUCOSE 1H P 50 G GLC PO SERPL-MCNC: 117 MG/DL (ref 65–139)
HCT VFR BLD AUTO: 33.4 % (ref 34–46.6)
HGB BLD-MCNC: 11 G/DL (ref 11.1–15.9)
IMM GRANULOCYTES # BLD: 0.1 X10E3/UL (ref 0–0.1)
IMM GRANULOCYTES NFR BLD: 1 %
LYMPHOCYTES # BLD AUTO: 1.2 X10E3/UL (ref 0.7–3.1)
LYMPHOCYTES NFR BLD AUTO: 14 %
MCH RBC QN AUTO: 29.1 PG (ref 26.6–33)
MCHC RBC AUTO-ENTMCNC: 32.9 G/DL (ref 31.5–35.7)
MCV RBC AUTO: 88 FL (ref 79–97)
MONOCYTES # BLD AUTO: 0.6 X10E3/UL (ref 0.1–0.9)
MONOCYTES NFR BLD AUTO: 7 %
NEUTROPHILS # BLD AUTO: 7 X10E3/UL (ref 1.4–7)
NEUTROPHILS NFR BLD AUTO: 77 %
PLATELET # BLD AUTO: 323 X10E3/UL (ref 150–379)
RBC # BLD AUTO: 3.78 X10E6/UL (ref 3.77–5.28)
WBC # BLD AUTO: 9.2 X10E3/UL (ref 3.4–10.8)

## 2017-12-05 ENCOUNTER — ROUTINE PRENATAL (OUTPATIENT)
Dept: OBGYN CLINIC | Age: 22
End: 2017-12-05

## 2017-12-05 VITALS
DIASTOLIC BLOOD PRESSURE: 68 MMHG | WEIGHT: 141.4 LBS | BODY MASS INDEX: 26.7 KG/M2 | SYSTOLIC BLOOD PRESSURE: 112 MMHG | HEIGHT: 61 IN

## 2017-12-05 DIAGNOSIS — Z3A.28 28 WEEKS GESTATION OF PREGNANCY: ICD-10-CM

## 2017-12-05 NOTE — PATIENT INSTRUCTIONS
Weeks 26 to 30 of Your Pregnancy: Care Instructions  Your Care Instructions    You are now in your last trimester of pregnancy. Your baby is growing rapidly. And you'll probably feel your baby moving around more often. Your doctor may ask you to count your baby's kicks. Your back may ache as your body gets used to your baby's size and length. If you haven't already had the Tdap shot during this pregnancy, talk to your doctor about getting it. It will help protect your  against pertussis infection. During this time, it's important to take care of yourself and pay attention to what your body needs. If you feel sexual, explore ways to be close with your partner that match your comfort and desire. Use the tips provided in this care sheet to find ways to be sexual in your own way. Follow-up care is a key part of your treatment and safety. Be sure to make and go to all appointments, and call your doctor if you are having problems. It's also a good idea to know your test results and keep a list of the medicines you take. How can you care for yourself at home? Take it easy at work  · Take frequent breaks. If possible, stop working when you are tired, and rest during your lunch hour. · Take bathroom breaks every 2 hours. · Change positions often. If you sit for long periods, stand up and walk around. · When you stand for a long time, keep one foot on a low stool with your knee bent. After standing a lot, sit with your feet up. · Avoid fumes, chemicals, and tobacco smoke. Be sexual in your own way  · Having sex during pregnancy is okay, unless your doctor tells you not to. · You may be very interested in sex, or you may have no interest at all. · Your growing belly can make it hard to find a good position during intercourse. [place] and explore. · You may get cramps in your uterus when your partner touches your breasts.   · A back rub may relieve the backache or cramps that sometimes follow orgasm. Learn about  labor  · Watch for signs of  labor. You may be going into labor if:  ¨ You have menstrual-like cramps, with or without nausea. ¨ You have about 4 or more contractions in 20 minutes, or about 8 or more within 1 hour, even after you have had a glass of water and are resting. ¨ You have a low, dull backache that does not go away when you change your position. ¨ You have pain or pressure in your pelvis that comes and goes in a pattern. ¨ You have intestinal cramping or flu-like symptoms, with or without diarrhea. ¨ You notice an increase or change in your vaginal discharge. Discharge may be heavy, mucus-like, watery, or streaked with blood. ¨ Your water breaks. · If you think you have  labor:  ¨ Drink 2 or 3 glasses of water or juice. Not drinking enough fluids can cause contractions. ¨ Stop what you are doing, and empty your bladder. Then lie down on your left side for at least 1 hour. ¨ While lying on your side, find your breast bone. Put your fingers in the soft spot just below it. Move your fingers down toward your belly button to find the top of your uterus. Check to see if it is tight. ¨ Contractions can be weak or strong. Record your contractions for an hour. Time a contraction from the start of one contraction to the start of the next one. ¨ Single or several strong contractions without a pattern are called Otto-Kirby contractions. They are practice contractions but not the start of labor. They often stop if you change what you are doing. ¨ Call your doctor if you have regular contractions. Where can you learn more? Go to http://cortney-williams.info/. Enter A061 in the search box to learn more about \"Weeks 26 to 30 of Your Pregnancy: Care Instructions. \"  Current as of: 2017  Content Version: 11.4  © 3126-0448 [employer].  Care instructions adapted under license by [employer] (which disclaims liability or warranty for this information). If you have questions about a medical condition or this instruction, always ask your healthcare professional. [place] [address] any warranty or liability for your use of this information.

## 2017-12-05 NOTE — PROGRESS NOTES
Problem List  Date Reviewed: 2017          Codes Class Noted    Pregnancy ICD-10-CM: Z34.90  ICD-9-CM: V22.2  2014    Overview Addendum 2017 10:05 AM by Arron Horan CNM       FAS  WNL posterior placenta no previa, s=d  Boy! Smoker 1 ppd  Declines Flu Vaccine  Glucola_____  Considering Tdap                 S: feeling better today after vomitting over the weekend, was unable to go to work Monday due to how bad she was feeling  O: concentrated urine, FH AGA , GFM  A: 21 yo  28.4 weeks- follow up from gastritis- most likely viral   P: note for work from Monday, follow up Silke Isaac 9038 2 weeks. hydrate today and have gatorade. Call if not feeling better. may return to work for reg shift tomorrow  Isabel Ray CNM

## 2017-12-05 NOTE — LETTER
To Whom It May Concern, Please excuse Jeffrey Serafin from work on 12/04/17. If you have any questions, please feel free to contact our office at [phone]. Thank you, 
 
 
 
 
Denise Hui CNM

## 2017-12-19 ENCOUNTER — ROUTINE PRENATAL (OUTPATIENT)
Dept: OBGYN CLINIC | Age: 22
End: 2017-12-19

## 2017-12-19 VITALS
BODY MASS INDEX: 27.41 KG/M2 | DIASTOLIC BLOOD PRESSURE: 78 MMHG | WEIGHT: 145.2 LBS | SYSTOLIC BLOOD PRESSURE: 130 MMHG | HEIGHT: 61 IN

## 2017-12-19 DIAGNOSIS — Z34.82 PRENATAL CARE, SUBSEQUENT PREGNANCY IN SECOND TRIMESTER: Primary | ICD-10-CM

## 2017-12-19 NOTE — LETTER
To Whom It May Concern, Please allow Mj Linares to have frequent opportunities for hydration and bathroom breaks. She is currently in the third trimester of pregnancy and I have recommended increased hydration to avoid  labor concerns. If you have any questions, please feel free to contact our office at [phone]. Thank you, Lissette Card CNM

## 2017-12-19 NOTE — PATIENT INSTRUCTIONS
Weeks 30 to 32 of Your Pregnancy: Care Instructions  Your Care Instructions    You have made it to the final months of your pregnancy. By now, your baby is really starting to look like a baby, with hair and plump skin. As you enter the final weeks of pregnancy, the reality of having a baby may start to set in. This is the time to settle on a name, get your household in order, set up a safe nursery, and find quality  if needed. Doing these things in advance will allow you to focus on caring for and enjoying your new baby. You may also want to have a tour of your hospital's labor and delivery unit to get a better idea of what to expect while you are in the hospital.  During these last months, it is very important to take good care of yourself and pay attention to what your body needs. If your doctor says it is okay for you to work, don't push yourself too hard. Use the tips provided in this care sheet to ease heartburn and care for varicose veins. If you haven't already had the Tdap shot during this pregnancy, talk to your doctor about getting it. It will help protect your  against pertussis infection. Follow-up care is a key part of your treatment and safety. Be sure to make and go to all appointments, and call your doctor if you are having problems. It's also a good idea to know your test results and keep a list of the medicines you take. How can you care for yourself at home? Pay attention to your baby's movements  · You should feel your baby move several times every day. · Your baby now turns less, and kicks and jabs more. · Your baby sleeps 20 to 45 minutes at a time and is more active at certain times of day. · If your doctor wants you to count your baby's kicks:  ¨ Empty your bladder, and lie on your side or relax in a comfortable chair. ¨ Write down your start time. ¨ Pay attention only to your baby's movements. Count any movement except hiccups.   ¨ After you have counted 10 movements, write down your stop time. ¨ Write down how many minutes it took for your baby to move 10 times. ¨ If an hour goes by and you have not recorded 10 movements, have something to eat or drink and then count for another hour. If you do not record 10 movements in either hour, call your doctor. Ease heartburn  · Eat small, frequent meals. · Do not eat chocolate, peppermint, or very spicy foods. Avoid drinks with caffeine, such as coffee, tea, and sodas. · Avoid bending over or lying down after meals. · Talk a short walk after you eat. · If heartburn is a problem at night, do not eat for 2 hours before bedtime. · Take antacids like Mylanta, Maalox, Rolaids, or Tums. Do not take antacids that have sodium bicarbonate. Care for varicose veins  · Varicose veins are blood vessels that stretch out with the extra blood during pregnancy. Your legs may ache or throb. Most varicose veins will go away after the birth. · Avoid standing for long periods of time. Sit with your legs crossed at the ankles, not the knees. · Sit with your feet propped up. · Avoid tight clothing or stockings. Wear support hose. · Exercise regularly. Try walking for at least 30 minutes a day. Where can you learn more? Go to http://cortney-williams.info/. Enter P976 in the search box to learn more about \"Weeks 30 to 32 of Your Pregnancy: Care Instructions. \"  Current as of: March 16, 2017  Content Version: 11.4  © 3459-9526 [employer]. Care instructions adapted under license by [employer] (which disclaims liability or warranty for this information). If you have questions about a medical condition or this instruction, always ask your healthcare professional. [place] [address] any warranty or liability for your use of this information.

## 2017-12-19 NOTE — PROGRESS NOTES
Problem List  Date Reviewed: 2017          Codes Class Noted    Pregnancy ICD-10-CM: Z34.90  ICD-9-CM: V22.2  2014    Overview Addendum 2017 11:28 AM by Hazel Gonzalez LPN     [id number]  FAS  WNL posterior placenta no previa, s=d  Boy!   Smoker 1 ppd  Declines Flu Vaccine  Glucola 117  Considering Tdap               S: mic walker and pelvic pressure while at work  Drinks little water a day, soda with her at visit, works at [employer] does not get many opportunities for water or bathroom breaks-  More mic walker during work and feels better at home when resting, no PTL concerns with first preg  O: FH cw GA  Abdomen soft non tender  Passed gtt  A: 19 yo  SIUP 30.4weeks   P: norbert 2 weeks  Pregnancy belt for support  Note for work to allow time for increased hydration and accomdate bathroom breaks  PTL precuations  Candace Gomes CNM

## 2018-01-02 ENCOUNTER — ROUTINE PRENATAL (OUTPATIENT)
Dept: OBGYN CLINIC | Age: 23
End: 2018-01-02

## 2018-01-02 VITALS
BODY MASS INDEX: 27.79 KG/M2 | DIASTOLIC BLOOD PRESSURE: 72 MMHG | SYSTOLIC BLOOD PRESSURE: 110 MMHG | WEIGHT: 147.2 LBS | HEIGHT: 61 IN

## 2018-01-02 DIAGNOSIS — Z3A.32 32 WEEKS GESTATION OF PREGNANCY: Primary | ICD-10-CM

## 2018-01-02 NOTE — PROGRESS NOTES
Problem List  Date Reviewed: 12/19/2017          Codes Class Noted    Pregnancy ICD-10-CM: Z34.90  ICD-9-CM: V22.2  8/6/2014    Overview Addendum 12/5/2017 11:28 AM by Kenia Hurst LPN     E9S3  FAS  WNL posterior placenta no previa, s=d  Boy!   Smoker 1 ppd  Declines Flu Vaccine  Glucola 117  Considering Tdap

## 2018-01-02 NOTE — PATIENT INSTRUCTIONS

## 2018-01-02 NOTE — LETTER
To Whom It May Concern, Please excuse Rivera Antunez from work on 01/01/18 and 01/02/18 due to an upper respiratory infection in pregnancy. If you have any questions, please call the office at [phone]. Thank you, Madelyn Gandhi CNM

## 2018-01-15 ENCOUNTER — ROUTINE PRENATAL (OUTPATIENT)
Dept: OBGYN CLINIC | Age: 23
End: 2018-01-15

## 2018-01-15 VITALS
SYSTOLIC BLOOD PRESSURE: 122 MMHG | BODY MASS INDEX: 28.25 KG/M2 | HEIGHT: 61 IN | DIASTOLIC BLOOD PRESSURE: 72 MMHG | WEIGHT: 149.6 LBS

## 2018-01-15 DIAGNOSIS — Z3A.34 34 WEEKS GESTATION OF PREGNANCY: ICD-10-CM

## 2018-01-15 NOTE — LETTER
1/15/2018 10:06 AM 
 
Ms. Evaristo Contreras [address] [place] [address] Drive [address] [facility] [address] [phone] Please allow patient to be out of work today,  January 15, 2018. Sincerely, 
 
 
 
 
 
Lady Felicitas CNM

## 2018-01-15 NOTE — PATIENT INSTRUCTIONS

## 2018-01-15 NOTE — PROGRESS NOTES
Problem List  Date Reviewed: 2018          Codes Class Noted    Pregnancy ICD-10-CM: Z34.90  ICD-9-CM: V22.2  2014    Overview Addendum 2017 11:28 AM by Jake Camarena LPN     [id number]  FAS  WNL posterior placenta no previa, s=d  Boy! Smoker 1 ppd  Declines Flu Vaccine  Glucola 117  Considering Tdap                 S: c/o severe hip pain that started this morning when she tried to get out of bed.   O:  FH 34 cm    ABSNT- gravid  Left hip- normal ROM, tender with abduction     A: 19 yo  SIUP 34.3 weeks  P: GUERA 2 weeks  Off work today, epsom salt bath, tylenol   Pregnancy support belt  Note to be off work today  Pt declines note to decrease hours at work, states she does well most days, hip just more uncomfortable today   Kandice Morris CNM

## 2018-01-30 ENCOUNTER — ROUTINE PRENATAL (OUTPATIENT)
Dept: OBGYN CLINIC | Age: 23
End: 2018-01-30

## 2018-01-30 VITALS
HEIGHT: 61 IN | WEIGHT: 155.2 LBS | DIASTOLIC BLOOD PRESSURE: 68 MMHG | SYSTOLIC BLOOD PRESSURE: 120 MMHG | BODY MASS INDEX: 29.3 KG/M2

## 2018-01-30 DIAGNOSIS — Z3A.36 36 WEEKS GESTATION OF PREGNANCY: ICD-10-CM

## 2018-01-30 DIAGNOSIS — Z34.83 PRENATAL CARE, SUBSEQUENT PREGNANCY IN THIRD TRIMESTER: Primary | ICD-10-CM

## 2018-01-30 LAB — GRBS, EXTERNAL: NEGATIVE

## 2018-01-30 NOTE — PATIENT INSTRUCTIONS

## 2018-01-30 NOTE — PROGRESS NOTES
Problem List  Date Reviewed: 1/15/2018          Codes Class Noted    Pregnancy ICD-10-CM: Z34.90  ICD-9-CM: V22.2  2014    Overview Addendum 2018  2:16 PM by SYLVESTER Mazariegoss CNBLAKE care    FAS  WNL posterior placenta no previa, s=d  Boy! Smoker 1 ppd  Declines Flu Vaccine  Glucola 117  Considering Tdap  GBS ___                 S:GBS today,feels ok, still working states she is uncomfortable but appropriately so. No concerns.   O:  VTX via leopolds  ABSNT- Gravid  SVE /-2  A: 19 yo  SIUP 36.4 weeks  P: GUERA 1 week  Yogi Mcguire CNM

## 2018-02-01 LAB — GP B STREP DNA SPEC QL NAA+PROBE: NEGATIVE

## 2018-02-05 DIAGNOSIS — Z3A.36 36 WEEKS GESTATION OF PREGNANCY: ICD-10-CM

## 2018-02-06 ENCOUNTER — ROUTINE PRENATAL (OUTPATIENT)
Dept: OBGYN CLINIC | Age: 23
End: 2018-02-06

## 2018-02-06 VITALS
DIASTOLIC BLOOD PRESSURE: 62 MMHG | BODY MASS INDEX: 29.49 KG/M2 | WEIGHT: 156.2 LBS | SYSTOLIC BLOOD PRESSURE: 122 MMHG | HEIGHT: 61 IN

## 2018-02-06 DIAGNOSIS — Z34.83 PRENATAL CARE, SUBSEQUENT PREGNANCY IN THIRD TRIMESTER: Primary | ICD-10-CM

## 2018-02-06 NOTE — PATIENT INSTRUCTIONS
Week 37 of Your Pregnancy: Care Instructions  Your Care Instructions    You are near the end of your pregnancy-and you're probably pretty uncomfortable. It may be harder to walk around. Lying down probably isn't comfortable either. You may have trouble getting to sleep or staying asleep. Most women deliver their babies between 40 and 41 weeks. This is a good time to think about packing a bag for the hospital with items you'll need. Then you'll be ready when labor starts. Follow-up care is a key part of your treatment and safety. Be sure to make and go to all appointments, and call your doctor if you are having problems. It's also a good idea to know your test results and keep a list of the medicines you take. How can you care for yourself at home? Learn about breastfeeding  · Breastfeeding is best for your baby and good for you. · Breast milk has antibodies to help your baby fight infections. · Mothers who breastfeed often lose weight faster, because making milk burns calories. · Learning the best ways to hold your baby will make breastfeeding easier. · Let your partner bathe and diaper the baby to keep your partner from feeling left out. Snuggle together when you breastfeed. · You may want to learn how to use a breast pump and store your milk. · If you choose to bottle feed, make the feeding feel like breastfeeding so you can bond with your baby. Always hold your baby and the bottle. Do not prop bottles or let your baby fall asleep with a bottle. Learn about crying  · It is common for babies to cry for 1 to 3 hours a day. Some cry more, some cry less. · Babies don't cry to make you upset or because you are a bad parent. · Crying is how your baby communicates. Your baby may be hungry; have gas; need a diaper change; or feel cold, warm, tired, lonely, or tense. Sometimes babies cry for unknown reasons. · If you respond to your baby's needs, he or she will learn to trust you.   · Try to stay calm when your baby cries. Your baby may get more upset if he or she senses that you are upset. Know how to care for your   · Your baby's umbilical cord stump will drop off on its own, usually between 1 and 2 weeks. To care for your baby's umbilical cord area:  ¨ Clean the area at the bottom of the cord 2 or 3 times a day. ¨ Pay special attention to the area where the cord attaches to the skin. ¨ Keep the diaper folded below the cord. ¨ Use a damp washcloth or cotton ball to sponge bathe your baby until the stump has come off. · Your baby's first dark stool is called meconium. After the meconium is passed, your baby will develop his or her own bowel pattern. ¨ Some babies, especially  babies, have several bowel movements a day. Others have one or two a day, or one every 2 to 3 days. ¨  babies often have loose, yellow stools. Formula-fed babies have more formed stools. ¨ If your baby's stools look like little pellets, he or she is constipated. After 2 days of constipation, call your baby's doctor. · If your baby will be circumcised, you can care for him at home. ¨ Gently rinse his penis with warm water after every diaper change. Do not try to remove the film that forms on the penis. This film will go away on its own. Pat dry. ¨ Put petroleum ointment, such as Vaseline, on the area of the diaper that will touch your baby's penis. This will keep the diaper from sticking to your baby. ¨ Ask the doctor about giving your baby acetaminophen (Tylenol) for pain. Where can you learn more? Go to http://cortney-williams.info/. Enter 68 [id number] in the search box to learn more about \"Week 37 of Your Pregnancy: Care Instructions. \"  Current as of: 2017  Content Version: 11.4  © 8658-6390 [employer]. Care instructions adapted under license by [employer] (which disclaims liability or warranty for this information).  If you have questions about a medical condition or this instruction, always ask your healthcare professional. [place] [address] any warranty or liability for your use of this information.

## 2018-02-13 ENCOUNTER — ROUTINE PRENATAL (OUTPATIENT)
Dept: OBGYN CLINIC | Age: 23
End: 2018-02-13

## 2018-02-13 VITALS
DIASTOLIC BLOOD PRESSURE: 64 MMHG | HEIGHT: 61 IN | BODY MASS INDEX: 29.07 KG/M2 | WEIGHT: 154 LBS | SYSTOLIC BLOOD PRESSURE: 112 MMHG

## 2018-02-13 DIAGNOSIS — Z34.83 PRENATAL CARE, SUBSEQUENT PREGNANCY IN THIRD TRIMESTER: Primary | ICD-10-CM

## 2018-02-13 NOTE — PROGRESS NOTES
Problem List  Date Reviewed: 2018          Codes Class Noted    Pregnancy ICD-10-CM: Z34.90  ICD-9-CM: V22.2  2014    Overview Addendum 2018  8:27 AM by SYLVESTER Akers CNM care    FAS  WNL posterior placenta no previa, s=d  Boy! Smoker 1 ppd  Declines Flu Vaccine  Glucola 117  Considering Tdap  GBS neg                 S: Still has rash and itching is unbearable.   O:   FH 38    ABSNT- gravid  SVE- 2/60/-2  Rash on abdomen- appears to be yeast  A: 21 yo  SIUP 38.4 weeks   P: GUERA 1 week  rx- nystantin cream and powder as well as diflucan 100 mg every day x 7 days  Torrie Dickerson CNM

## 2018-02-13 NOTE — PATIENT INSTRUCTIONS
Week 38 of Your Pregnancy: Care Instructions  Your Care Instructions    Believe it or not, your baby is almost here. You may have ideas about your baby's personality because of how much he or she moves. Or you may have noticed how he or she responds to sounds, warmth, cold, and light. You may even know what kind of music your baby likes. By now, you have a better idea of what to expect during delivery. You may have talked about your birth preferences with your doctor. But even if you want a vaginal birth, it is a good idea to learn about  births.  birth means that your baby is born through a cut (incision) in your lower belly. It is sometimes the best choice for the health of the baby and the mother. This care sheet can help you understand  births. It also gives you information about what to expect after your baby is born. And it helps you understand more about postpartum depression. Follow-up care is a key part of your treatment and safety. Be sure to make and go to all appointments, and call your doctor if you are having problems. It's also a good idea to know your test results and keep a list of the medicines you take. How can you care for yourself at home? Learn about  birth  · Most C-sections are unplanned. They are done because of problems that occur during labor. These problems might include:  ¨ Labor that slows or stops. ¨ High blood pressure or other problems for the mother. ¨ Signs of distress in the baby. These signs may include a very fast or slow heart rate. · Although most mothers and babies do well after , it is major surgery. It has more risks than a vaginal delivery. · In some cases, a planned  may be safer than a vaginal delivery. This may be the case if:  ¨ The mother has a health problem, such as a heart condition. ¨ The baby isn't in a head-down position for delivery. This is called a breech position.   ¨ The uterus has scars from past surgeries. This could increase the chance of a tear in the uterus. ¨ There is a problem with the placenta. ¨ The mother has an infection, such as genital herpes, that could be spread to the baby. ¨ The mother is having twins or more. ¨ The baby weighs 9 to 10 pounds or more. · Because of the risks of , planned C-sections generally should be done only for medical reasons. And a planned  should be done at 39 weeks or later unless there is a medical reason to do it sooner. Know what to expect after delivery, and plan for the first few weeks at home  · You, your baby, and your partner or  will get identification bands. Only people with matching bands can  the baby from the nursery. · You will learn how to feed, diaper, and bathe your baby. And you will learn how to care for the umbilical cord stump. If your baby will be circumcised, you will also learn how to care for that. · Ask people to wait to visit you until you are at home. And ask them to wash their hands before they touch your baby. · Make sure you have another adult in your home for at least 2 or 3 days after the birth. · During the first 2 weeks, limit when friends and family can visit. · Do not allow visitors who have colds or infections. Make sure all visitors are up to date with their vaccinations. Never let anyone smoke around your baby. · Try to nap when the baby naps. Be aware of postpartum depression  · \"Baby blues\" are common for the first 1 to 2 weeks after birth. You may cry or feel sad or irritable for no reason. · For some women, these feelings last longer and are more intense. This is called postpartum depression. · If your symptoms last for more than a few weeks or you feel very depressed, ask your doctor for help. · Postpartum depression can be treated. Support groups and counseling can help. Sometimes medicine can also help. Where can you learn more?   Go to http://cortney-williams.info/. Enter B044 in the search box to learn more about \"Week 38 of Your Pregnancy: Care Instructions. \"  Current as of: March 16, 2017  Content Version: 11.4  © 8587-0543 [employer]. Care instructions adapted under license by [employer] (which disclaims liability or warranty for this information). If you have questions about a medical condition or this instruction, always ask your healthcare professional. [address] any warranty or liability for your use of this information.

## 2018-02-13 NOTE — LETTER
To Whom It May Concern, 
 
 
Eduarda Pacheco is an established patient for obstetric care. Please allow her to take maternity leave as of 02/12/18. GARY: 02/23/18. If you have any questions, please feel free to contact our office at [phone]. Thank you, 
 
 
Jia Kamara CNM

## 2018-02-14 RX ORDER — NYSTATIN AND TRIAMCINOLONE ACETONIDE 100000; 1 [USP'U]/G; MG/G
CREAM TOPICAL 2 TIMES DAILY
Qty: 30 G | Refills: 0 | Status: SHIPPED | OUTPATIENT
Start: 2018-02-14 | End: 2020-07-14

## 2018-02-14 RX ORDER — NYSTATIN 100000 [USP'U]/G
1 POWDER TOPICAL 2 TIMES DAILY
Qty: 1 BOTTLE | Refills: 0 | Status: SHIPPED | OUTPATIENT
Start: 2018-02-14 | End: 2020-07-14

## 2018-02-20 ENCOUNTER — ROUTINE PRENATAL (OUTPATIENT)
Dept: OBGYN CLINIC | Age: 23
End: 2018-02-20

## 2018-02-20 ENCOUNTER — HOSPITAL ENCOUNTER (INPATIENT)
Age: 23
LOS: 2 days | Discharge: HOME | End: 2018-02-22
Attending: OBSTETRICS & GYNECOLOGY | Admitting: OBSTETRICS & GYNECOLOGY
Payer: COMMERCIAL

## 2018-02-20 VITALS
DIASTOLIC BLOOD PRESSURE: 76 MMHG | WEIGHT: 155.8 LBS | HEIGHT: 61 IN | BODY MASS INDEX: 29.42 KG/M2 | SYSTOLIC BLOOD PRESSURE: 128 MMHG

## 2018-02-20 DIAGNOSIS — Z3A.39 39 WEEKS GESTATION OF PREGNANCY: Primary | ICD-10-CM

## 2018-02-20 PROCEDURE — 75410000002 HC LABOR FEE PER 1 HR

## 2018-02-20 PROCEDURE — 99283 EMERGENCY DEPT VISIT LOW MDM: CPT

## 2018-02-20 PROCEDURE — 65270000029 HC RM PRIVATE

## 2018-02-20 RX ORDER — FENTANYL CITRATE 50 UG/ML
100 INJECTION, SOLUTION INTRAMUSCULAR; INTRAVENOUS
Status: DISCONTINUED | OUTPATIENT
Start: 2018-02-20 | End: 2018-02-21 | Stop reason: HOSPADM

## 2018-02-20 RX ORDER — TERBUTALINE SULFATE 1 MG/ML
0.25 INJECTION SUBCUTANEOUS AS NEEDED
Status: DISCONTINUED | OUTPATIENT
Start: 2018-02-20 | End: 2018-02-21 | Stop reason: HOSPADM

## 2018-02-20 RX ORDER — SODIUM CHLORIDE 0.9 % (FLUSH) 0.9 %
5-10 SYRINGE (ML) INJECTION EVERY 8 HOURS
Status: DISCONTINUED | OUTPATIENT
Start: 2018-02-21 | End: 2018-02-21 | Stop reason: HOSPADM

## 2018-02-20 RX ORDER — SODIUM CHLORIDE 0.9 % (FLUSH) 0.9 %
5-10 SYRINGE (ML) INJECTION AS NEEDED
Status: DISCONTINUED | OUTPATIENT
Start: 2018-02-20 | End: 2018-02-21 | Stop reason: HOSPADM

## 2018-02-20 NOTE — IP AVS SNAPSHOT
[address] [place] [address] 
[phone] Patient: Christiana Rangel MRN: [id number] :1995 A check nestor indicates which time of day the medication should be taken. My Medications ASK your doctor about these medications Instructions Each Dose to Equal  
 Morning Noon Evening Bedtime  
 butalbital-acetaminophen-caffeine -40 mg per tablet Commonly known as:  Jaswinder Altamirano Your last dose was: Your next dose is: Take 1 Tab by mouth every six (6) hours as needed for Headache. Max Daily Amount: 4 Tabs. 1 Tab  
    
   
   
   
  
 calcium carbonate 200 mg calcium (500 mg) Chew Commonly known as:  TUMS Your last dose was: Your next dose is: Take 1 Tab by mouth daily. 1 Tab  
    
   
   
   
  
 nystatin powder Commonly known as:  MYCOSTATIN Your last dose was: Your next dose is:    
   
   
 Apply 1 g to affected area two (2) times a day. 1 g  
    
   
   
   
  
 nystatin-triamcinolone topical cream  
Commonly known as:  MYCOLOG II Your last dose was: Your next dose is:    
   
   
 Apply  to affected area two (2) times a day. PNV66-Iron Fumarate-FA-DSS-DHA 26-1.2- mg Cap Your last dose was: Your next dose is: Take  by mouth. TYLENOL 325 mg tablet Generic drug:  acetaminophen Your last dose was: Your next dose is: Take  by mouth every four (4) hours as needed for Pain. ZANTAC 150 mg tablet Generic drug:  raNITIdine Your last dose was: Your next dose is: Take 150 mg by mouth two (2) times a day.   
 150 mg

## 2018-02-20 NOTE — PROGRESS NOTES
Problem List  Date Reviewed: 2018          Codes Class Noted    Pregnancy ICD-10-CM: Z34.90  ICD-9-CM: V22.2  2014    Overview Addendum 2018  8:27 AM by SYLVESTER Hart CNM care    FAS  WNL posterior placenta no previa, s=d  Boy!   Smoker 1 ppd  Declines Flu Vaccine  Glucola 117  Considering Tdap  GBS neg

## 2018-02-20 NOTE — PATIENT INSTRUCTIONS
Week 39 of Your Pregnancy: Care Instructions  Your Care Instructions    During these final weeks, you may feel anxious to see your new baby. [place] babies often look different from what you see in pictures or movies. Right after birth, their heads may have a strange shape. Their eyes may be puffy. And their genitals may be swollen. They may also have very dry skin, or red marks on the eyelids, nose, or neck. Still, most parents think their babies are beautiful. Follow-up care is a key part of your treatment and safety. Be sure to make and go to all appointments, and call your doctor if you are having problems. It's also a good idea to know your test results and keep a list of the medicines you take. How can you care for yourself at home? Prepare to breastfeed  · If you are breastfeeding, continue to eat healthy foods. · Avoid alcohol, cigarettes, and drugs. This includes prescription and over-the-counter medicines. · You can help prevent sore nipples if you feed your baby in the correct position. Nurses will help you learn to do this. · Your  will need to be fed about every 1½ to 3 hours. Choose the right birth control after your baby is born  · Women who are breastfeeding can still get pregnant. Use birth control if you don't want to get pregnant. · Intrauterine devices (IUDs) work for women who want to wait at least 2 years before getting pregnant again. They are safe to use while you are breastfeeding. · Depo-Provera can be used while you are breastfeeding. It is a shot you get every 3 months. · Birth control pills work well. But you need a different kind of pill while you are breastfeeding. And when you start taking these pills, you need to make sure to use another type of birth control until you start your second pack. · Diaphragms, cervical caps, tubal implants, and condoms with spermicide work less well after birth.  If you have a diaphragm or cervical cap, you will need to have it refitted. · Tubal ligation (tying your tubes) and vasectomy are both permanent. These are good options if you are sure you are done having children. Where can you learn more? Go to http://cortney-williams.info/. Enter M395 in the search box to learn more about \"Week 39 of Your Pregnancy: Care Instructions. \"  Current as of: March 16, 2017  Content Version: 11.4  © 6369-5079 [employer]. Care instructions adapted under license by [employer] (which disclaims liability or warranty for this information). If you have questions about a medical condition or this instruction, always ask your healthcare professional. [address] any warranty or liability for your use of this information.

## 2018-02-20 NOTE — IP AVS SNAPSHOT
Summary of Care Report The Summary of Care report has been created to help improve care coordination. Users with access to [place] or [address] (Web-based application) may access additional patient information including the Discharge Summary. If you are not currently a [address] user and need more information, please call the number listed below in the [place] [address] section and ask to be connected with Medical Records. Facility Information Name Address Phone [address] [phone] Patient Information Patient Name Sex  Mikey Lorenzo ([id number]) Female 1995 Discharge Information Admitting Provider Service Area Unit Adrianna Owens MD / [address] 3w Mother Infant / [phone] Discharge Provider Discharge Date/Time Discharge Disposition Destination (none) 2018 Afternoon (Pending) AHR (none) Patient Language Language ENGLISH [13] Hospital Problems as of 2018  Reviewed: 2018  2:20 PM by Arlet Dave CNM Class Noted - Resolved Last Modified POA Active Problems Normal labor  2018 - Present 2018 by Arlet Dave CNM Unknown Entered by Arlet Dave CNM Non-Hospital Problems as of 2018  Reviewed: 2018  2:20 PM by Arlet Dave CNM Class Noted - Resolved Last Modified Active Problems Pregnancy  2014 - Present 2018 by Luis Felipe Brown LPN Entered by Paulette Drew MD  
  Overview Addendum 2018  8:27 AM by Luis Felipe Brown LPN Desires CNM care  
FAS  WNL posterior placenta no previa, s=d Boy! Smoker 1 ppd Declines Flu Vaccine Glucola 117 Considering Tdap GBS neg You are allergic to the following No active allergies Current Discharge Medication List  
  
ASK your doctor about these medications Dose & Instructions Dispensing Information Comments  
 butalbital-acetaminophen-caffeine -40 mg per tablet Commonly known as:  Chey Mussel Dose:  1 Tab Take 1 Tab by mouth every six (6) hours as needed for Headache. Max Daily Amount: 4 Tabs. Quantity:  20 Tab Refills:  2  
   
 calcium carbonate 200 mg calcium (500 mg) Chew Commonly known as:  TUMS Dose:  1 Tab Take 1 Tab by mouth daily. Refills:  0  
   
 nystatin powder Commonly known as:  MYCOSTATIN Dose:  1 g Apply 1 g to affected area two (2) times a day. Quantity:  1 Bottle Refills:  0  
   
 nystatin-triamcinolone topical cream  
Commonly known as:  MYCOLOG II Apply  to affected area two (2) times a day. Quantity:  30 g Refills:  0 PNV66-Iron Fumarate-FA-DSS-DHA 26-1.2- mg Cap Take  by mouth. Refills:  0  
   
 TYLENOL 325 mg tablet Generic drug:  acetaminophen Take  by mouth every four (4) hours as needed for Pain. Refills:  0  
   
 ZANTAC 150 mg tablet Generic drug:  raNITIdine Dose:  150 mg Take 150 mg by mouth two (2) times a day. Refills:  0 Current Immunizations Name Date MMR 2014 Follow-up Information Follow up With Details Comments Contact Info [facility] As needed with breastfeeding questions or concerns [address] [address] 
[phone] Provider Unknown   Patient not available to ask Discharge Instructions After Your Delivery (the Postpartum Period): Care Instructions Your Care Instructions Congratulations on the birth of your baby. Like pregnancy, the  period can be a time of excitement, rowan, and exhaustion. You may look at your wondrous little baby and feel happy.  You may also be overwhelmed by your new sleep hours and new responsibilities. At first, babies often sleep during the days and are awake at night. They do not have a pattern or routine. They may make sudden gasps, jerk themselves awake, or look like they have crossed eyes. These are all normal, and they may even make you smile. In these first weeks after delivery, try to take good care of yourself. It may take 4 to 6 weeks to feel like yourself again, and possibly longer if you had a  birth. You will likely feel very tired for several weeks. Your days will be full of ups and downs, but lots of rowan as well. Follow-up care is a key part of your treatment and safety. Be sure to make and go to all appointments, and call your doctor if you are having problems. It's also a good idea to know your test results and keep a list of the medicines you take. How can you care for yourself at home? Take care of your body after delivery · Use pads instead of tampons for the bloody flow that may last as long as 2 weeks. · Ease cramps with ibuprofen (Advil, Motrin). · Ease soreness of hemorrhoids and the area between your vagina and rectum with ice compresses or witch hazel pads. · Ease constipation by drinking lots of fluid and eating high-fiber foods. Ask your doctor about over-the-counter stool softeners. · Cleanse yourself with a gentle squeeze of warm water from a bottle instead of wiping with toilet paper. · Take a sitz bath in warm water several times a day. · Wear a good nursing bra. Ease sore and swollen breasts with warm, wet washcloths. · If you are not breastfeeding, use ice rather than heat for breast soreness. · Your period may not start for several months if you are breastfeeding. You may bleed more, and longer at first, than you did before you got pregnant. · Wait until you are healed (about 4 to 6 weeks) before you have sexual intercourse. Your doctor will tell you when it is okay to have sex. · Try not to travel with your baby for 5 or 6 weeks. If you take a long car trip, make frequent stops to walk around and stretch. Avoid exhaustion · Rest every day. Try to nap when your baby naps. · Ask another adult to be with you for a few days after delivery. · Plan for  if you have other children. · Stay flexible so you can eat at odd hours and sleep when you need to. Both you and your baby are making new schedules. · Plan small trips to get out of the house. Change can make you feel less tired. · Ask for help with housework, cooking, and shopping. Remind yourself that your job is to care for your baby. Know about help for postpartum depression · \"Baby blues\" are common for the first 1 to 2 weeks after birth. You may cry or feel sad or irritable for no reason. · Rest whenever you can. Being tired makes it harder to handle your emotions. · Go for walks with your baby. · Talk to your partner, friends, and family about your feelings. · If your symptoms last for more than a few weeks, or if you feel very depressed, ask your doctor for help. · Postpartum depression can be treated. Support groups and counseling can help. Sometimes medicine can also help. Stay healthy · Eat healthy foods so you have more energy, make good breast milk, and lose extra baby pounds. · If you breastfeed, avoid alcohol and drugs. Stay smoke-free. If you quit during pregnancy, congratulations. · Start daily exercise after 4 to 6 weeks, but rest when you feel tired. · Learn exercises to tone your belly. Do Kegel exercises to regain strength in your pelvic muscles. You can do these exercises while you stand or sit. ¨ Squeeze the same muscles you would use to stop your urine. Your belly and thighs should not move. ¨ Hold the squeeze for 3 seconds, and then relax for 3 seconds. ¨ Start with 3 seconds. Then add 1 second each week until you are able to squeeze for 10 seconds. ¨ Repeat the exercise 10 to 15 times for each session. Do three or more sessions each day. · Find a class for new mothers and new babies that has an exercise time. · If you had a  birth, give yourself a bit more time before you exercise, and be careful. When should you call for help? Call 911 anytime you think you may need emergency care. For example, call if: 
? · You passed out (lost consciousness). ?Call your doctor now or seek immediate medical care if: 
? · You have severe vaginal bleeding. This means you are passing blood clots and soaking through a pad each hour for 2 or more hours. ? · You are dizzy or lightheaded, or you feel like you may faint. ? · You have a fever. ? · You have new belly pain, or your pain gets worse. ? Watch closely for changes in your health, and be sure to contact your doctor if: 
? · Your vaginal bleeding seems to be getting heavier. ? · You have new or worse vaginal discharge. ? · You feel sad, anxious, or hopeless for more than a few days. ? · You do not get better as expected. Where can you learn more? Go to http://cortney-williams.info/. Enter A461 in the search box to learn more about \"After Your Delivery (the Postpartum Period): Care Instructions. \" Current as of: 2017 Content Version: 11.4 © 9929-8551 [employer]. Care instructions adapted under license by [employer] (which disclaims liability or warranty for this information). If you have questions about a medical condition or this instruction, always ask your healthcare professional. [place] [address] any warranty or liability for your use of this information. Vaginal Childbirth: Care Instructions Your Care Instructions Your body will slowly heal in the next few weeks. It is easy to get too tired and overwhelmed during the first weeks after your baby is born. Changes in your hormones can shift your mood without warning. You may find it hard to meet the extra demands on your energy and time. Take it easy on yourself. Follow-up care is a key part of your treatment and safety. Be sure to make and go to all appointments, and call your doctor if you are having problems. It's also a good idea to know your test results and keep a list of the medicines you take. How can you care for yourself at home? · Vaginal bleeding and cramps ¨ After delivery, you will have a bloody discharge from the vagina. This will turn pink within a week and then white or yellow after about 10 days. It may last for 2 to 4 weeks or longer, until the uterus has healed. Use pads instead of tampons until you stop bleeding. ¨ Do not worry if you pass some blood clots, as long as they are smaller than a golf ball. If you have a tear or stitches in your vaginal area, change the pad at least every 4 hours to prevent soreness and infection. ¨ You may have cramps for the first few days after childbirth. These are normal and occur as the uterus shrinks to normal size. Take an over-the-counter pain medicine, such as acetaminophen (Tylenol), ibuprofen (Advil, Motrin), or naproxen (Aleve), for cramps. Read and follow all instructions on the label. Do not take aspirin, because it can cause more bleeding. ¨ Do not take two or more pain medicines at the same time unless the doctor told you to. Many pain medicines have acetaminophen, which is Tylenol. Too much acetaminophen (Tylenol) can be harmful. · Stitches ¨ If you have stitches, they will dissolve on their own and do not need to be removed. Follow your doctor's instructions for cleaning the stitched area. ¨ Put ice or a cold pack on your painful area for 10 to 20 minutes at a time, several times a day, for the first few days. Put a thin cloth between the ice and your skin.  
¨ Sit in a few inches of warm water (sitz bath) 3 times a day and after bowel movements. The warm water helps with pain and itching. If you do not have a tub, a warm shower might help. · Breast fullness ¨ Your breasts may overfill (engorge) in the first few days after delivery. To help milk flow and to relieve pain, warm your breasts in the shower or by using warm, moist towels before nursing. ¨ If you are not nursing, do not put warmth on your breasts or touch your breasts. Wear a tight bra or sports bra and use ice until the fullness goes away. This usually takes 2 to 3 days. ¨ Put ice or a cold pack on your breast after nursing to reduce swelling and pain. Put a thin cloth between the ice and your skin. · Activity ¨ Eat a balanced diet. Do not try to lose weight by cutting calories. Keep taking your prenatal vitamins, or take a multivitamin. ¨ Get as much rest as you can. Try to take naps when your baby sleeps during the day. ¨ Get some exercise every day. But do not do any heavy exercise until your doctor says it is okay. ¨ Wait until you are healed (about 4 to 6 weeks) before you have sexual intercourse. Your doctor will tell you when it is okay to have sex. ¨ Talk to your doctor about birth control. You can get pregnant even before your period returns. Also, you can get pregnant while you are breastfeeding. · Mental health ¨ It is normal to have some sadness, anxiety, sleeplessness, and mood swings after you go home. If you feel upset or hopeless for more than a few days or are having trouble doing the things you need to do, talk to your doctor. · Constipation and hemorrhoids ¨ Drink plenty of fluids, enough so that your urine is light yellow or clear like water. If you have kidney, heart, or liver disease and have to limit fluids, talk with your doctor before you increase the amount of fluids you drink. ¨ Eat plenty of fiber each day. Have a bran muffin or bran cereal for breakfast, and try eating a piece of fruit for a mid-afternoon snack. ¨ For painful, itchy hemorrhoids, put ice or a cold pack on the area several times a day for 10 minutes at a time. Follow this by putting a warm compress on the area for another 10 to 20 minutes or by sitting in a shallow, warm bath. When should you call for help? Call 911 anytime you think you may need emergency care. For example, call if: 
? · You passed out (lost consciousness). ?Call your doctor now or seek immediate medical care if: 
? · You have severe vaginal bleeding. ? · You are dizzy or lightheaded, or you feel like you may faint. ? · You have a fever. ? · You have new or more pain in your belly or pelvis. ? Watch closely for changes in your health, and be sure to contact your doctor if: 
? · Your vaginal bleeding seems to be getting heavier. ? · You have new or worse vaginal discharge. ? · You feel sad, anxious, or hopeless for more than a few days. ? · You do not get better as expected. Where can you learn more? Go to http://cortney-williams.info/. Enter A822 in the search box to learn more about \"Vaginal Childbirth: Care Instructions. \" Current as of: March 16, 2017 Content Version: 11.4 © 8651-6634 [employer]. Care instructions adapted under license by [employer] (which disclaims liability or warranty for this information). If you have questions about a medical condition or this instruction, always ask your healthcare professional. [place] [address] any warranty or liability for your use of this information. Chart Review Routing History Recipient Method Report Sent By Alene Goldberg Sarah Rohrs Gleda Blazer, NP  
[address] [facility] [address], [address] [place] [address] Phone: [phone] Mail IP Auto Routed Notes Jaky Almanzar MD [[id number]] 8/6/2014 12:08 AM 08/06/2014 Shiloh Nagy NP  
[address] [facility] [address], [address] [place] [address] Phone: [phone] Mail IP Auto Routed Notes Polly Kirkland MD [[id number]] 8/6/2014 12:17 AM 08/06/2014 Shiloh Carranza NP  
[address], [address] [place] [address] Phone: [phone] Mail IP Auto Routed Notes Polly Kirkland MD [[id number]] 8/6/2014 12:20 AM 08/06/2014 Shiloh Carranza NP  
[address] [address], [address] [place] [address] Phone: [phone] Mail IP Auto Routed Notes Polly Kirkland MD [[id number]] 8/6/2014  2:49 AM 08/06/2014 Shiloh Carranza NP  
[address] [address] [address], [address] [place] [address] Phone: [phone] Mail IP Auto Routed Notes Jakub Springer [[id number]] 8/8/2014  8:53 AM 08/08/2014 Belinda Escobar MD  
Phone: [phone] In [facility] CUSTOM LAB REPORT Bunnyotf Carnes [[id number]] 8/17/2017  9:58 AM 8/17/2017

## 2018-02-21 PROBLEM — Z37.9 NORMAL LABOR: Status: ACTIVE | Noted: 2018-02-21

## 2018-02-21 LAB
ERYTHROCYTE [DISTWIDTH] IN BLOOD BY AUTOMATED COUNT: 14.6 % (ref 11.5–14.5)
HCT VFR BLD AUTO: 35.6 % (ref 35–47)
HGB BLD-MCNC: 12 G/DL (ref 11.5–16)
MCH RBC QN AUTO: 29.3 PG (ref 26–34)
MCHC RBC AUTO-ENTMCNC: 33.7 G/DL (ref 30–36.5)
MCV RBC AUTO: 86.8 FL (ref 80–99)
NRBC # BLD: 0 K/UL (ref 0–0.01)
NRBC BLD-RTO: 0 PER 100 WBC
PLATELET # BLD AUTO: 324 K/UL (ref 150–400)
PMV BLD AUTO: 10.5 FL (ref 8.9–12.9)
RBC # BLD AUTO: 4.1 M/UL (ref 3.8–5.2)
WBC # BLD AUTO: 18.5 K/UL (ref 3.6–11)

## 2018-02-21 PROCEDURE — 36415 COLL VENOUS BLD VENIPUNCTURE: CPT | Performed by: ADVANCED PRACTICE MIDWIFE

## 2018-02-21 PROCEDURE — 0HQ9XZZ REPAIR PERINEUM SKIN, EXTERNAL APPROACH: ICD-10-PCS | Performed by: ADVANCED PRACTICE MIDWIFE

## 2018-02-21 PROCEDURE — 65410000002 HC RM PRIVATE OB

## 2018-02-21 PROCEDURE — 74011000250 HC RX REV CODE- 250: Performed by: ADVANCED PRACTICE MIDWIFE

## 2018-02-21 PROCEDURE — 74011250637 HC RX REV CODE- 250/637: Performed by: ADVANCED PRACTICE MIDWIFE

## 2018-02-21 PROCEDURE — 74011250636 HC RX REV CODE- 250/636: Performed by: ADVANCED PRACTICE MIDWIFE

## 2018-02-21 PROCEDURE — 75410000002 HC LABOR FEE PER 1 HR

## 2018-02-21 PROCEDURE — 75410000000 HC DELIVERY VAGINAL/SINGLE

## 2018-02-21 PROCEDURE — 85027 COMPLETE CBC AUTOMATED: CPT | Performed by: ADVANCED PRACTICE MIDWIFE

## 2018-02-21 PROCEDURE — 75410000003 HC RECOV DEL/VAG/CSECN EA 0.5 HR

## 2018-02-21 RX ORDER — ONDANSETRON 2 MG/ML
INJECTION INTRAMUSCULAR; INTRAVENOUS
Status: DISPENSED
Start: 2018-02-21 | End: 2018-02-21

## 2018-02-21 RX ORDER — SODIUM CHLORIDE, SODIUM LACTATE, POTASSIUM CHLORIDE, CALCIUM CHLORIDE 600; 310; 30; 20 MG/100ML; MG/100ML; MG/100ML; MG/100ML
125 INJECTION, SOLUTION INTRAVENOUS CONTINUOUS
Status: DISCONTINUED | OUTPATIENT
Start: 2018-02-21 | End: 2018-02-22 | Stop reason: HOSPADM

## 2018-02-21 RX ORDER — LIDOCAINE HYDROCHLORIDE 10 MG/ML
INJECTION INFILTRATION; PERINEURAL
Status: DISPENSED
Start: 2018-02-21 | End: 2018-02-21

## 2018-02-21 RX ORDER — SODIUM CHLORIDE 0.9 % (FLUSH) 0.9 %
5-10 SYRINGE (ML) INJECTION EVERY 8 HOURS
Status: DISCONTINUED | OUTPATIENT
Start: 2018-02-21 | End: 2018-02-22 | Stop reason: HOSPADM

## 2018-02-21 RX ORDER — LIDOCAINE HYDROCHLORIDE 10 MG/ML
10 INJECTION, SOLUTION EPIDURAL; INFILTRATION; INTRACAUDAL; PERINEURAL ONCE
Status: COMPLETED | OUTPATIENT
Start: 2018-02-21 | End: 2018-02-21

## 2018-02-21 RX ORDER — DOCUSATE SODIUM 100 MG/1
100 CAPSULE, LIQUID FILLED ORAL
Status: DISCONTINUED | OUTPATIENT
Start: 2018-02-21 | End: 2018-02-22 | Stop reason: HOSPADM

## 2018-02-21 RX ORDER — DIPHENHYDRAMINE HCL 25 MG
25 CAPSULE ORAL
Status: DISCONTINUED | OUTPATIENT
Start: 2018-02-21 | End: 2018-02-22 | Stop reason: HOSPADM

## 2018-02-21 RX ORDER — ACETAMINOPHEN 325 MG/1
650 TABLET ORAL
Status: DISCONTINUED | OUTPATIENT
Start: 2018-02-21 | End: 2018-02-22 | Stop reason: HOSPADM

## 2018-02-21 RX ORDER — IBUPROFEN 400 MG/1
800 TABLET ORAL EVERY 8 HOURS
Status: DISCONTINUED | OUTPATIENT
Start: 2018-02-21 | End: 2018-02-22 | Stop reason: HOSPADM

## 2018-02-21 RX ORDER — SIMETHICONE 80 MG
80 TABLET,CHEWABLE ORAL
Status: DISCONTINUED | OUTPATIENT
Start: 2018-02-21 | End: 2018-02-22 | Stop reason: HOSPADM

## 2018-02-21 RX ORDER — MISOPROSTOL 200 UG/1
TABLET ORAL
Status: DISPENSED
Start: 2018-02-21 | End: 2018-02-21

## 2018-02-21 RX ORDER — OXYTOCIN/RINGER'S LACTATE 20/1000 ML
125-1000 PLASTIC BAG, INJECTION (ML) INTRAVENOUS AS NEEDED
Status: DISCONTINUED | OUTPATIENT
Start: 2018-02-21 | End: 2018-02-22 | Stop reason: HOSPADM

## 2018-02-21 RX ORDER — ONDANSETRON 4 MG/1
4 TABLET, ORALLY DISINTEGRATING ORAL
Status: DISCONTINUED | OUTPATIENT
Start: 2018-02-21 | End: 2018-02-22 | Stop reason: HOSPADM

## 2018-02-21 RX ORDER — HYDROCORTISONE 1 %
CREAM (GRAM) TOPICAL AS NEEDED
Status: DISCONTINUED | OUTPATIENT
Start: 2018-02-21 | End: 2018-02-22 | Stop reason: HOSPADM

## 2018-02-21 RX ORDER — SODIUM CHLORIDE 0.9 % (FLUSH) 0.9 %
5-10 SYRINGE (ML) INJECTION AS NEEDED
Status: DISCONTINUED | OUTPATIENT
Start: 2018-02-21 | End: 2018-02-22 | Stop reason: HOSPADM

## 2018-02-21 RX ORDER — MISOPROSTOL 200 UG/1
600 TABLET ORAL ONCE
Status: COMPLETED | OUTPATIENT
Start: 2018-02-21 | End: 2018-02-21

## 2018-02-21 RX ORDER — AMMONIA 15 % (W/V)
1 AMPUL (EA) INHALATION AS NEEDED
Status: DISCONTINUED | OUTPATIENT
Start: 2018-02-21 | End: 2018-02-22 | Stop reason: HOSPADM

## 2018-02-21 RX ORDER — HYDROCORTISONE ACETATE PRAMOXINE HCL 2.5; 1 G/100G; G/100G
CREAM TOPICAL AS NEEDED
Status: DISCONTINUED | OUTPATIENT
Start: 2018-02-21 | End: 2018-02-22 | Stop reason: HOSPADM

## 2018-02-21 RX ADMIN — LIDOCAINE HYDROCHLORIDE 10 ML: 10 INJECTION, SOLUTION EPIDURAL; INFILTRATION; INTRACAUDAL; PERINEURAL at 02:11

## 2018-02-21 RX ADMIN — IBUPROFEN 800 MG: 400 TABLET, FILM COATED ORAL at 23:17

## 2018-02-21 RX ADMIN — ACETAMINOPHEN 650 MG: 325 TABLET, FILM COATED ORAL at 14:06

## 2018-02-21 RX ADMIN — Medication 10 ML: at 00:02

## 2018-02-21 RX ADMIN — IBUPROFEN 800 MG: 400 TABLET, FILM COATED ORAL at 06:31

## 2018-02-21 RX ADMIN — ACETAMINOPHEN 650 MG: 325 TABLET, FILM COATED ORAL at 09:28

## 2018-02-21 RX ADMIN — IBUPROFEN 800 MG: 400 TABLET, FILM COATED ORAL at 14:40

## 2018-02-21 RX ADMIN — ACETAMINOPHEN 650 MG: 325 TABLET, FILM COATED ORAL at 20:41

## 2018-02-21 RX ADMIN — MISOPROSTOL 600 MCG: 200 TABLET ORAL at 02:07

## 2018-02-21 RX ADMIN — FENTANYL CITRATE 50 MCG: 50 INJECTION, SOLUTION INTRAMUSCULAR; INTRAVENOUS at 02:27

## 2018-02-21 NOTE — L&D DELIVERY NOTE
Delivery Note    Provider:  Kaylin Grewal CNM    Assistant: none    Pre-Delivery Diagnosis: Term pregnancy spontaneous labor    Post-Delivery Diagnosis: Living  infant Male    Intrapartum Event: delayed delivery of placenta,  cytotec 600 mcg uses    Procedure: Spontaneous vaginal delivery    Epidural: NO    Monitor:  Fetal Heart Tones - External and Uterine Contractions - External    Indications for instrumental delivery: none    Estimated Blood Loss: 350    Episiotomy: none    Laceration(s):  1st degree labial left    Laceration(s) repair: YES    Presentation: Cephalic    Fetal Description: gandhi    Fetal Position: Occiput Anterior    Birth Weight: 6-0    Birth Length: pending    Apgar - One Minute: 9    Apgar - Five Minutes: 9    Umbilical Cord: 3 vessels present    Specimens: none           Complications:  none           Cord Blood Results:   Information for the patient's :  Maya Arias [[id number]]   No results found for: PCTABR, PCTDIG, BILI, ABORH    Prenatal Labs:     Lab Results   Component Value Date/Time    HBsAg, External Negative 07/10/2017    HIV, External Non-Reactive 07/10/2017    Rubella, External Immune 07/10/2017    Gonorrhea, External Negative 07/10/2017    Chlamydia, External Negative 07/10/2017    GrBStrep, External Negative 2018        Attending Attestation: I was present and scrubbed for the entire procedure. Patient progressed to complete with urge to push within hours of arrival.  SROM just prior to pushing. She pushed through several contractions to deliver a viable male with spontaneous lusty cry. He was placed on maternal abdomen, color to pink. His cord was allowed to stop pulsating before it was clamped x2 and then cut by FOB. NO specimens were obtained. Placenta delivered after approximately 15 minutes with assist of cytotec and teasing with a ring forcep as membranes trailed. Laceration site sutured under local without difficulty.   Patient tolerated procedure well with minimal discomfort.  Mom and baby to recovery in satisfactory condition    Signed By:  Ara Cosme CNM     February 21, 2018

## 2018-02-21 NOTE — LACTATION NOTE
This note was copied from a baby's chart. Initial Lactation Consultation: Infant born early this morning vaginally to a  mom at 44 5/7 weeks gestation. Infant was gaggy when I was in the room. He has been sleepy since his initial feeding after birth. Demonstrated to mom ways to awaken infant. He has a poorly elicited suck. Was able to express colostrum and give it to the infant. Mom encouraged to do skin to skin with infant. [place] behaviors reviewed, Very sleepy in first 24 hours, mother must wake baby for feedings, offer hand expressed drops, baby usually will respond and feed vigorously 6-8 times in the first day, but is important to offer 8-12 times,  After baby wakes from deep sleep usually on the 2nd or 3rd day a new behavior pattern follows. Frequent feeding during this brief behavioral phase preceeding milk transition is called cluster feeding. Typical  behavior: baby becomes vigorous at the breast and wants to feed frequently- every 1-2 hours for several feedings. This is the normal process by which the baby demands his/her supply. This type of frequent feeding is the stimulation which causes lactogenesis II (milk coming in).

## 2018-02-21 NOTE — ROUTINE PROCESS
0- TRANSFER - IN REPORT:    Verbal report received from VIJI Cosme RN(name) on Pradip Aponte  being received from L&D(unit) for routine progression of care      Report consisted of patients Situation, Background, Assessment and   Recommendations(SBAR). Information from the following report(s) SBAR was reviewed with the receiving nurse. Opportunity for questions and clarification was provided. Assessment completed upon patients arrival to unit and care assumed.

## 2018-02-21 NOTE — ADT AUTH CERT NOTES
Patient Demographics        Patient Name [address] Sex  Address Phone       Attila Jones [id number] Female 1995 [address] [place], [place] [id number] [phone] (Home)  [phone] (Mobile)           CSN:       [id number]           Admit Date: Admit Time Room Bed       2018 11:10  [[id number]] 01 [[id number]]           Attending Providers        Provider Pager From Waldo Kelley MD         ADM   DEL VAG   Birth History   Birth Information   Birth Length: 48.3 cm   Birth Weight: 2.715 kg   Birth Head Circ: 32.5 cm   Gestational Age: 44 5/7 weeks   Delivery Method: Vaginal, Spontaneous Delivery

## 2018-02-21 NOTE — H&P
History & Physical    Name: Missy Tompkins MRN: [id number]  SSN: [id number]    YOB: 1995  Age: 20 y.o. Sex: female        Subjective:  Here with complaints of painful contractions for several hours at term. Estimated Date of Delivery: 18  OB History      Para Term  AB Living    2 1 1   1    SAB TAB Ectopic Molar Multiple Live Births         1          Ms. Alicia Payton is admitted with pregnancy at 39w4d for active labor. Prenatal course was uncomplicated except for smoking . Please see prenatal records for details. Patient Active Problem List    Diagnosis    Pregnancy     Desires CNM care    FAS  WNL posterior placenta no previa, s=d  Boy! Smoker 1 ppd  Declines Flu Vaccine  Glucola 117  Considering Tdap  GBS neg       No specialty comments available. Past Medical History:   Diagnosis Date    Abnormal Pap smear         Abnormal Papanicolaou smear of cervix     follow up normal    Anxiety     Fatigue     Heart abnormality     Memory loss     Neurological disorder     migraines    Other ill-defined conditions(799.89)     Bladder reflux    Ringing in ears     Thyroid activity decreased     Thyroid disease     hypothyroidism in first pregnancy    VSD (ventricular septal defect)      Past Surgical History:   Procedure Laterality Date    CARDIAC SURG PROCEDURE UNLIST      VSD repair    HX OTHER SURGICAL       Social History     Occupational History    Not on file.      Social History Main Topics    Smoking status: Current Every Day Smoker     Packs/day: 1.00    Smokeless tobacco: Current User    Alcohol use No    Drug use: No    Sexual activity: Yes     Partners: Male     Family History   Problem Relation Age of Onset    Cancer Maternal Grandmother      lung cancer    Diabetes Maternal Grandmother     Hypertension Maternal Grandmother     Breast Cancer Other        No Known Allergies  Prior to Admission medications    Medication Sig Start Date End Date Taking? Authorizing Provider   nystatin-triamcinolone (MYCOLOG II) topical cream Apply  to affected area two (2) times a day. 2/14/18  Yes Amanda Mata NP   acetaminophen (TYLENOL) 325 mg tablet Take  by mouth every four (4) hours as needed for Pain. Yes Historical Provider   raNITIdine (ZANTAC) 150 mg tablet Take 150 mg by mouth two (2) times a day. Yes Historical Provider   calcium carbonate (TUMS) 200 mg calcium (500 mg) chew Take 1 Tab by mouth daily. Yes Historical Provider   PNV66-Iron Fumarate-FA-DSS-DHA 26-1.2- mg cap Take  by mouth. Yes Historical Provider   butalbital-acetaminophen-caffeine (FIORICET, ESGIC) -40 mg per tablet Take 1 Tab by mouth every six (6) hours as needed for Headache. Max Daily Amount: 4 Tabs. 7/10/17  Yes Woo Wilson MD   nystatin (MYCOSTATIN) powder Apply 1 g to affected area two (2) times a day. 2/14/18   Amanda Mata NP        Review of Systems: A comprehensive review of systems was negative except for that written in the HPI. Objective:     Vitals:  Vitals:    02/20/18 2313 02/20/18 2316   BP:  132/75   Pulse:  (!) 110   Resp:  18   Temp:  98.4 °F (36.9 °C)   Weight: 155 lb (70.3 kg)    Height: 5' 1\" (1.549 m)         Physical Exam:  Patient without distress.   Oriented and responds appropriately to command  Lung:respirations even and unlabored  Abdomen: soft, nontender  Gravid  s=d  Fundus: soft and non tender  Perineum: blood absent, amniotic fluid absent  Cervical Exam: 6-7/100/-2  Membranes:  Intact  Fetal Heart Rate: Reactive NST  FHT baseline 155  With good beat to beat variability,  Positive accelerations, no deceleration,  Irregular painful contractions since placed on monitors by nursing staff about 35 minutes ago    Prenatal Labs:   Lab Results   Component Value Date/Time    Rubella, External Immune 07/10/2017    GrBStrep, External Negative 01/30/2018    HBsAg, External Negative 07/10/2017    HIV, External Non-Reactive 07/10/2017 Gonorrhea, External Negative 07/10/2017    Chlamydia, External Negative 07/10/2017        Assessment/Plan:     Plan: Admit for Reassuring fetal status, Labor  Progressing normally, Continue plan for vaginal delivery. Group B Strep was negative.     Signed By:  Cari Rodriguez CNM     February 20, 2018

## 2018-02-21 NOTE — PROGRESS NOTES
2/20/2018 11:10 PM Received to LDR 11 with c/o q 3-4 minute contractions since 2100. Denies ROM or vaginal bleeding    6136 Call to Chelsie Harris    [id number] TRANSFER - OUT REPORT:    Verbal report given to [facility], [place], operated by [facility] RN(name) on [facility]  being transferred to [facility](unit) for routine progression of care       Report consisted of patients Situation, Background, Assessment and   Recommendations(SBAR). Information from the following report(s) SBAR, Intake/Output and MAR was reviewed with the receiving nurse. Lines:   Peripheral IV 02/21/18 Right Hand (Active)        Opportunity for questions and clarification was provided.       Patient transported with:   Registered Nurse

## 2018-02-22 VITALS
BODY MASS INDEX: 29.27 KG/M2 | WEIGHT: 155 LBS | DIASTOLIC BLOOD PRESSURE: 66 MMHG | SYSTOLIC BLOOD PRESSURE: 109 MMHG | TEMPERATURE: 98.2 F | HEIGHT: 61 IN | HEART RATE: 84 BPM | RESPIRATION RATE: 20 BRPM

## 2018-02-22 PROCEDURE — 74011250637 HC RX REV CODE- 250/637: Performed by: ADVANCED PRACTICE MIDWIFE

## 2018-02-22 RX ADMIN — HYDROCORTISONE: 1 CREAM TOPICAL at 02:35

## 2018-02-22 RX ADMIN — IBUPROFEN 800 MG: 400 TABLET, FILM COATED ORAL at 14:40

## 2018-02-22 RX ADMIN — ACETAMINOPHEN 650 MG: 325 TABLET, FILM COATED ORAL at 13:34

## 2018-02-22 RX ADMIN — IBUPROFEN 800 MG: 400 TABLET, FILM COATED ORAL at 06:45

## 2018-02-22 NOTE — ROUTINE PROCESS
0800- SBAR report received from [place] [id number]- verbal understanding of discharge instructions received from patient, signing pad is not working.

## 2018-02-22 NOTE — PROGRESS NOTES
Post-Partum Day Number 1 Progress Note    Patient doing well post-partum without significant complaint. Voiding withour difficulty, normal lochia. She is considering discharge later today if  can be discharged as well. Vitals:  Patient Vitals for the past 8 hrs:   BP Temp Pulse Resp   18 0224 102/60 98 °F (36.7 °C) 64 20     Temp (24hrs), Av.1 °F (36.7 °C), Min:98 °F (36.7 °C), Max:98.2 °F (36.8 °C)      Vital signs stable, afebrile. Exam:  Patient without distress. Respirations are even and unlabored. Skin is warm and dry. Rash remains to abdomen and truck but inflammation has improved. Abdomen soft, fundus firm at level of umbilicus, nontender               Perineum with normal lochia noted. Lower extremities are negative for swelling, cords or tenderness. Assessment and Plan:  Patient appears to be having uncomplicated post-partum course. Continue routine perineal care and maternal education. May be discharged home today if desires otherwise plan discharge tomorrow if no problems occur.

## 2018-02-22 NOTE — ROUTINE PROCESS
Bedside and Verbal shift change report given to RICHARD Lyn RN (oncoming nurse) by Jeremie Garner RN (offgoing nurse). Report included the following information SBAR, Kardex, Procedure Summary, Intake/Output, MAR and Recent Results.

## 2018-02-22 NOTE — DISCHARGE INSTRUCTIONS
After Your Delivery (the Postpartum Period): Care Instructions  Your Care Instructions    Congratulations on the birth of your baby. Like pregnancy, the  period can be a time of excitement, rowan, and exhaustion. You may look at your wondrous little baby and feel happy. You may also be overwhelmed by your new sleep hours and new responsibilities. At first, babies often sleep during the days and are awake at night. They do not have a pattern or routine. They may make sudden gasps, jerk themselves awake, or look like they have crossed eyes. These are all normal, and they may even make you smile. In these first weeks after delivery, try to take good care of yourself. It may take 4 to 6 weeks to feel like yourself again, and possibly longer if you had a  birth. You will likely feel very tired for several weeks. Your days will be full of ups and downs, but lots of rowan as well. Follow-up care is a key part of your treatment and safety. Be sure to make and go to all appointments, and call your doctor if you are having problems. It's also a good idea to know your test results and keep a list of the medicines you take. How can you care for yourself at home? Take care of your body after delivery  · Use pads instead of tampons for the bloody flow that may last as long as 2 weeks. · Ease cramps with ibuprofen (Advil, Motrin). · Ease soreness of hemorrhoids and the area between your vagina and rectum with ice compresses or witch hazel pads. · Ease constipation by drinking lots of fluid and eating high-fiber foods. Ask your doctor about over-the-counter stool softeners. · Cleanse yourself with a gentle squeeze of warm water from a bottle instead of wiping with toilet paper. · Take a sitz bath in warm water several times a day. · Wear a good nursing bra. Ease sore and swollen breasts with warm, wet washcloths. · If you are not breastfeeding, use ice rather than heat for breast soreness.   · Your period may not start for several months if you are breastfeeding. You may bleed more, and longer at first, than you did before you got pregnant. · Wait until you are healed (about 4 to 6 weeks) before you have sexual intercourse. Your doctor will tell you when it is okay to have sex. · Try not to travel with your baby for 5 or 6 weeks. If you take a long car trip, make frequent stops to walk around and stretch. Avoid exhaustion  · Rest every day. Try to nap when your baby naps. · Ask another adult to be with you for a few days after delivery. · Plan for  if you have other children. · Stay flexible so you can eat at odd hours and sleep when you need to. Both you and your baby are making new schedules. · Plan small trips to get out of the house. Change can make you feel less tired. · Ask for help with housework, cooking, and shopping. Remind yourself that your job is to care for your baby. Know about help for postpartum depression  · \"Baby blues\" are common for the first 1 to 2 weeks after birth. You may cry or feel sad or irritable for no reason. · Rest whenever you can. Being tired makes it harder to handle your emotions. · Go for walks with your baby. · Talk to your partner, friends, and family about your feelings. · If your symptoms last for more than a few weeks, or if you feel very depressed, ask your doctor for help. · Postpartum depression can be treated. Support groups and counseling can help. Sometimes medicine can also help. Stay healthy  · Eat healthy foods so you have more energy, make good breast milk, and lose extra baby pounds. · If you breastfeed, avoid alcohol and drugs. Stay smoke-free. If you quit during pregnancy, congratulations. · Start daily exercise after 4 to 6 weeks, but rest when you feel tired. · Learn exercises to tone your belly. Do Kegel exercises to regain strength in your pelvic muscles. You can do these exercises while you stand or sit.   ¨ Squeeze the same muscles you would use to stop your urine. Your belly and thighs should not move. ¨ Hold the squeeze for 3 seconds, and then relax for 3 seconds. ¨ Start with 3 seconds. Then add 1 second each week until you are able to squeeze for 10 seconds. ¨ Repeat the exercise 10 to 15 times for each session. Do three or more sessions each day. · Find a class for new mothers and new babies that has an exercise time. · If you had a  birth, give yourself a bit more time before you exercise, and be careful. When should you call for help? Call 911 anytime you think you may need emergency care. For example, call if:  ? · You passed out (lost consciousness). ?Call your doctor now or seek immediate medical care if:  ? · You have severe vaginal bleeding. This means you are passing blood clots and soaking through a pad each hour for 2 or more hours. ? · You are dizzy or lightheaded, or you feel like you may faint. ? · You have a fever. ? · You have new belly pain, or your pain gets worse. ? Watch closely for changes in your health, and be sure to contact your doctor if:  ? · Your vaginal bleeding seems to be getting heavier. ? · You have new or worse vaginal discharge. ? · You feel sad, anxious, or hopeless for more than a few days. ? · You do not get better as expected. Where can you learn more? Go to http://cortney-williams.info/. Enter A461 in the search box to learn more about \"After Your Delivery (the Postpartum Period): Care Instructions. \"  Current as of: 2017  Content Version: 11.4  © 3074-8943 [employer]. Care instructions adapted under license by [employer] (which disclaims liability or warranty for this information). If you have questions about a medical condition or this instruction, always ask your healthcare professional. [place] [address] any warranty or liability for your use of this information.          Vaginal Childbirth: Care Instructions  Your Care Instructions    Your body will slowly heal in the next few weeks. It is easy to get too tired and overwhelmed during the first weeks after your baby is born. Changes in your hormones can shift your mood without warning. You may find it hard to meet the extra demands on your energy and time. Take it easy on yourself. Follow-up care is a key part of your treatment and safety. Be sure to make and go to all appointments, and call your doctor if you are having problems. It's also a good idea to know your test results and keep a list of the medicines you take. How can you care for yourself at home? · Vaginal bleeding and cramps  ¨ After delivery, you will have a bloody discharge from the vagina. This will turn pink within a week and then white or yellow after about 10 days. It may last for 2 to 4 weeks or longer, until the uterus has healed. Use pads instead of tampons until you stop bleeding. ¨ Do not worry if you pass some blood clots, as long as they are smaller than a golf ball. If you have a tear or stitches in your vaginal area, change the pad at least every 4 hours to prevent soreness and infection. ¨ You may have cramps for the first few days after childbirth. These are normal and occur as the uterus shrinks to normal size. Take an over-the-counter pain medicine, such as acetaminophen (Tylenol), ibuprofen (Advil, Motrin), or naproxen (Aleve), for cramps. Read and follow all instructions on the label. Do not take aspirin, because it can cause more bleeding. ¨ Do not take two or more pain medicines at the same time unless the doctor told you to. Many pain medicines have acetaminophen, which is Tylenol. Too much acetaminophen (Tylenol) can be harmful. · Stitches  ¨ If you have stitches, they will dissolve on their own and do not need to be removed. Follow your doctor's instructions for cleaning the stitched area.   ¨ Put ice or a cold pack on your painful area for 10 to 20 minutes at a time, several times a day, for the first few days. Put a thin cloth between the ice and your skin. ¨ Sit in a few inches of warm water (sitz bath) 3 times a day and after bowel movements. The warm water helps with pain and itching. If you do not have a tub, a warm shower might help. · Breast fullness  ¨ Your breasts may overfill (engorge) in the first few days after delivery. To help milk flow and to relieve pain, warm your breasts in the shower or by using warm, moist towels before nursing. ¨ If you are not nursing, do not put warmth on your breasts or touch your breasts. Wear a tight bra or sports bra and use ice until the fullness goes away. This usually takes 2 to 3 days. ¨ Put ice or a cold pack on your breast after nursing to reduce swelling and pain. Put a thin cloth between the ice and your skin. · Activity  ¨ Eat a balanced diet. Do not try to lose weight by cutting calories. Keep taking your prenatal vitamins, or take a multivitamin. ¨ Get as much rest as you can. Try to take naps when your baby sleeps during the day. ¨ Get some exercise every day. But do not do any heavy exercise until your doctor says it is okay. ¨ Wait until you are healed (about 4 to 6 weeks) before you have sexual intercourse. Your doctor will tell you when it is okay to have sex. ¨ Talk to your doctor about birth control. You can get pregnant even before your period returns. Also, you can get pregnant while you are breastfeeding. · Mental health  ¨ It is normal to have some sadness, anxiety, sleeplessness, and mood swings after you go home. If you feel upset or hopeless for more than a few days or are having trouble doing the things you need to do, talk to your doctor. · Constipation and hemorrhoids  ¨ Drink plenty of fluids, enough so that your urine is light yellow or clear like water.  If you have kidney, heart, or liver disease and have to limit fluids, talk with your doctor before you increase the amount of fluids you drink. ¨ Eat plenty of fiber each day. Have a bran muffin or bran cereal for breakfast, and try eating a piece of fruit for a mid-afternoon snack. ¨ For painful, itchy hemorrhoids, put ice or a cold pack on the area several times a day for 10 minutes at a time. Follow this by putting a warm compress on the area for another 10 to 20 minutes or by sitting in a shallow, warm bath. When should you call for help? Call 911 anytime you think you may need emergency care. For example, call if:  ? · You passed out (lost consciousness). ?Call your doctor now or seek immediate medical care if:  ? · You have severe vaginal bleeding. ? · You are dizzy or lightheaded, or you feel like you may faint. ? · You have a fever. ? · You have new or more pain in your belly or pelvis. ? Watch closely for changes in your health, and be sure to contact your doctor if:  ? · Your vaginal bleeding seems to be getting heavier. ? · You have new or worse vaginal discharge. ? · You feel sad, anxious, or hopeless for more than a few days. ? · You do not get better as expected. Where can you learn more? Go to http://cortney-williams.info/. Enter Q028 in the search box to learn more about \"Vaginal Childbirth: Care Instructions. \"  Current as of: March 16, 2017  Content Version: 11.4  © 2912-9228 [employer]. Care instructions adapted under license by [employer] (which disclaims liability or warranty for this information). If you have questions about a medical condition or this instruction, always ask your healthcare professional. [address] any warranty or liability for your use of this information.

## 2018-04-17 ENCOUNTER — OFFICE VISIT (OUTPATIENT)
Dept: OBGYN CLINIC | Age: 23
End: 2018-04-17

## 2018-04-17 VITALS
WEIGHT: 147.2 LBS | BODY MASS INDEX: 27.79 KG/M2 | HEIGHT: 61 IN | DIASTOLIC BLOOD PRESSURE: 70 MMHG | SYSTOLIC BLOOD PRESSURE: 112 MMHG

## 2018-04-17 NOTE — PROGRESS NOTES
Postpartum evaluation    Reid Frank is a 24 y.o. female who presents for a postpartum exam.     Delivery of a 5 lb 15.8 oz (2.715 kg) male infant via Vaginal, Spontaneous Delivery on 2/21/2018. Her baby is doing well. '[address]'    She has had a menses since delivery. She has had the following significant problems since her delivery: none    The patient is bottle feeding without difficulty. The patient would like to discuss options for birth control. She is currently taking: no medications. She is due for her next AE in 3 months. There were no vitals taken for this visit.     PHYSICAL EXAMINATION    Constitutional  · Appearance: well-nourished, well developed, alert, in no acute distress    HENT  · Head and Face: appears normal      Gastrointestinal  · Abdominal Examination: abdomen non-tender to palpation, no masses present  · Liver and spleen: no hepatomegaly present, spleen not palpable  · Hernias: no hernias identified    Genitourinary  · External Genitalia: normal appearance for age, no discharge present, no tenderness present, no inflammatory lesions present, no masses present, no atrophy present  · Vagina: normal vaginal vault without central or paravaginal defects, no discharge present, no inflammatory lesions present, no masses present  · Bladder: non-tender to palpation  · Urethra: appears normal  · Cervix: normal   · Uterus: normal size, shape and consistency  · Adnexa: no adnexal tenderness present, no adnexal masses present  · Perineum: perineum within normal limits, no evidence of trauma, no rashes or skin lesions present  · Anus: anus within normal limits, no hemorrhoids present  · Inguinal Lymph Nodes: no lymphadenopathy present    Skin  · General Inspection: no rash, no lesions identified    Neurologic/Psychiatric  · Mental Status:  · Orientation: grossly oriented to person, place and time  · Mood and Affect: mood normal, affect appropriate    Assessment:  Normal postpartum check  Undecided on birth control at this time  Denies PPD - resources given     Plan:  RTO for AE.   Rx for contraception:  Pamphlets given on IUDs     Joanne Reyez CNM

## 2019-05-29 ENCOUNTER — APPOINTMENT (OUTPATIENT)
Dept: ULTRASOUND IMAGING | Age: 24
End: 2019-05-29
Attending: EMERGENCY MEDICINE
Payer: COMMERCIAL

## 2019-05-29 ENCOUNTER — HOSPITAL ENCOUNTER (EMERGENCY)
Age: 24
Discharge: HOME | End: 2019-05-29
Attending: EMERGENCY MEDICINE | Admitting: EMERGENCY MEDICINE
Payer: COMMERCIAL

## 2019-05-29 VITALS
BODY MASS INDEX: 27.78 KG/M2 | SYSTOLIC BLOOD PRESSURE: 146 MMHG | DIASTOLIC BLOOD PRESSURE: 67 MMHG | TEMPERATURE: 98.5 F | HEART RATE: 64 BPM | OXYGEN SATURATION: 98 % | RESPIRATION RATE: 16 BRPM | WEIGHT: 147 LBS

## 2019-05-29 DIAGNOSIS — N83.201 CYST OF RIGHT OVARY: ICD-10-CM

## 2019-05-29 DIAGNOSIS — S30.1XXA CONTUSION OF ABDOMINAL WALL, INITIAL ENCOUNTER: Primary | ICD-10-CM

## 2019-05-29 LAB
ALBUMIN SERPL-MCNC: 3.9 G/DL (ref 3.5–5)
ALBUMIN/GLOB SERPL: 1.1 {RATIO} (ref 1.1–2.2)
ALP SERPL-CCNC: 114 U/L (ref 45–117)
ALT SERPL-CCNC: 13 U/L (ref 12–78)
ANION GAP SERPL CALC-SCNC: 3 MMOL/L (ref 5–15)
APPEARANCE UR: CLEAR
AST SERPL-CCNC: 12 U/L (ref 15–37)
BASOPHILS # BLD: 0.1 K/UL (ref 0–0.1)
BASOPHILS NFR BLD: 1 % (ref 0–1)
BILIRUB SERPL-MCNC: 0.3 MG/DL (ref 0.2–1)
BILIRUB UR QL: NEGATIVE
BUN SERPL-MCNC: 6 MG/DL (ref 6–20)
BUN/CREAT SERPL: 10 (ref 12–20)
CALCIUM SERPL-MCNC: 8.7 MG/DL (ref 8.5–10.1)
CHLORIDE SERPL-SCNC: 111 MMOL/L (ref 97–108)
CO2 SERPL-SCNC: 24 MMOL/L (ref 21–32)
COLOR UR: NORMAL
CREAT SERPL-MCNC: 0.58 MG/DL (ref 0.55–1.02)
DIFFERENTIAL METHOD BLD: ABNORMAL
EOSINOPHIL # BLD: 0.3 K/UL (ref 0–0.4)
EOSINOPHIL NFR BLD: 3 % (ref 0–7)
ERYTHROCYTE [DISTWIDTH] IN BLOOD BY AUTOMATED COUNT: 14.5 % (ref 11.5–14.5)
GLOBULIN SER CALC-MCNC: 3.4 G/DL (ref 2–4)
GLUCOSE SERPL-MCNC: 85 MG/DL (ref 65–100)
GLUCOSE UR STRIP.AUTO-MCNC: NEGATIVE MG/DL
HCG UR QL: NEGATIVE
HCT VFR BLD AUTO: 43.2 % (ref 35–47)
HGB BLD-MCNC: 13.7 G/DL (ref 11.5–16)
HGB UR QL STRIP: NEGATIVE
IMM GRANULOCYTES # BLD AUTO: 0 K/UL (ref 0–0.04)
IMM GRANULOCYTES NFR BLD AUTO: 0 % (ref 0–0.5)
KETONES UR QL STRIP.AUTO: NEGATIVE MG/DL
LEUKOCYTE ESTERASE UR QL STRIP.AUTO: NEGATIVE
LYMPHOCYTES # BLD: 2.6 K/UL (ref 0.8–3.5)
LYMPHOCYTES NFR BLD: 28 % (ref 12–49)
MCH RBC QN AUTO: 27.2 PG (ref 26–34)
MCHC RBC AUTO-ENTMCNC: 31.7 G/DL (ref 30–36.5)
MCV RBC AUTO: 85.9 FL (ref 80–99)
MONOCYTES # BLD: 0.5 K/UL (ref 0–1)
MONOCYTES NFR BLD: 6 % (ref 5–13)
NEUTS SEG # BLD: 5.9 K/UL (ref 1.8–8)
NEUTS SEG NFR BLD: 62 % (ref 32–75)
NITRITE UR QL STRIP.AUTO: NEGATIVE
NRBC # BLD: 0 K/UL (ref 0–0.01)
NRBC BLD-RTO: 0 PER 100 WBC
PH UR STRIP: 7 [PH] (ref 5–8)
PLATELET # BLD AUTO: 401 K/UL (ref 150–400)
PMV BLD AUTO: 9.6 FL (ref 8.9–12.9)
POTASSIUM SERPL-SCNC: 4.3 MMOL/L (ref 3.5–5.1)
PROT SERPL-MCNC: 7.3 G/DL (ref 6.4–8.2)
PROT UR STRIP-MCNC: NEGATIVE MG/DL
RBC # BLD AUTO: 5.03 M/UL (ref 3.8–5.2)
SODIUM SERPL-SCNC: 138 MMOL/L (ref 136–145)
SP GR UR REFRACTOMETRY: 1.01 (ref 1–1.03)
UROBILINOGEN UR QL STRIP.AUTO: 0.2 EU/DL (ref 0.2–1)
WBC # BLD AUTO: 9.4 K/UL (ref 3.6–11)

## 2019-05-29 PROCEDURE — 81003 URINALYSIS AUTO W/O SCOPE: CPT

## 2019-05-29 PROCEDURE — 99283 EMERGENCY DEPT VISIT LOW MDM: CPT

## 2019-05-29 PROCEDURE — 80053 COMPREHEN METABOLIC PANEL: CPT

## 2019-05-29 PROCEDURE — 76830 TRANSVAGINAL US NON-OB: CPT

## 2019-05-29 PROCEDURE — 76856 US EXAM PELVIC COMPLETE: CPT

## 2019-05-29 PROCEDURE — 36415 COLL VENOUS BLD VENIPUNCTURE: CPT

## 2019-05-29 PROCEDURE — 85025 COMPLETE CBC W/AUTO DIFF WBC: CPT

## 2019-05-29 PROCEDURE — 81025 URINE PREGNANCY TEST: CPT

## 2019-05-29 NOTE — LETTER
[address] 
[facility] EMERGENCY DEPT 
[address] [facility] [place] [address] [id number] 
[phone] Work/School Note Date: 5/29/2019 To Whom It May concern: 
 
Henry Tomlin was seen and treated today in the emergency room by the following provider(s): 
Attending Provider: Aure Carreno MD.   
 
 
 
Sincerely, 
 
 
 
 
Bridgett Nieto MD

## 2019-05-29 NOTE — DISCHARGE INSTRUCTIONS
Patient Education   Patient Education     Patient Education        Functional Ovarian Cyst: Care Instructions  Your Care Instructions    A functional ovarian cyst is a sac that forms on the surface of a woman's ovary during ovulation. The sac holds a maturing egg. Usually the sac goes away after the egg is released. But if the egg is not released, or if the sac closes up after the egg is released, the sac can swell up with fluid and form a cyst.  Functional ovarian cysts are different than ovarian growths caused by other problems, such as cancer. Most functional ovarian cysts cause no symptoms and go away on their own. Some cause mild pain. Others can cause severe pain when they rupture or bleed. Follow-up care is a key part of your treatment and safety. Be sure to make and go to all appointments, and call your doctor if you are having problems. It's also a good idea to know your test results and keep a list of the medicines you take. How can you care for yourself at home? · Use heat, such as a hot water bottle, a heating pad set on low, or a warm bath, to relax tense muscles and relieve cramping. · Be safe with medicines. Take pain medicines exactly as directed. ? If the doctor gave you a prescription medicine for pain, take it as prescribed. ? If you are not taking a prescription pain medicine, ask your doctor if you can take an over-the-counter medicine. · Avoid constipation. Make sure you drink enough fluids and include fruits, vegetables, and fiber in your diet each day. Constipation does not cause ovarian cysts, but it may make your pelvic pain worse. When should you call for help? Call your doctor now or seek immediate medical care if:    · You have severe vaginal bleeding.     · You have new or worse belly or pelvic pain.    Watch closely for changes in your health, and be sure to contact your doctor if:    · You have unusual vaginal bleeding.     · You do not get better as expected.    Where can you learn more? Go to http://cortney-williams.info/. Enter C873 in the search box to learn more about \"Functional Ovarian Cyst: Care Instructions. \"  Current as of: May 14, 2018  Content Version: 11.9  © 4660-3595 [employer] Care instructions adapted under license by [employer] (which disclaims liability or warranty for this information). If you have questions about a medical condition or this instruction, always ask your healthcare professional. [address] any warranty or liability for your use of this information. Abdominal Pain: Care Instructions  Your Care Instructions    Abdominal pain has many possible causes. Some aren't serious and get better on their own in a few days. Others need more testing and treatment. If your pain continues or gets worse, you need to be rechecked and may need more tests to find out what is wrong. You may need surgery to correct the problem. Don't ignore new symptoms, such as fever, nausea and vomiting, urination problems, pain that gets worse, and dizziness. These may be signs of a more serious problem. Your doctor may have recommended a follow-up visit in the next 8 to 12 hours. If you are not getting better, you may need more tests or treatment. The doctor has checked you carefully, but problems can develop later. If you notice any problems or new symptoms, get medical treatment right away. Follow-up care is a key part of your treatment and safety. Be sure to make and go to all appointments, and call your doctor if you are having problems. It's also a good idea to know your test results and keep a list of the medicines you take. How can you care for yourself at home? · Rest until you feel better. · To prevent dehydration, drink plenty of fluids, enough so that your urine is light yellow or clear like water. Choose water and other caffeine-free clear liquids until you feel better.  If you have kidney, heart, or liver disease and have to limit fluids, talk with your doctor before you increase the amount of fluids you drink. · If your stomach is upset, eat mild foods, such as rice, dry toast or crackers, bananas, and applesauce. Try eating several small meals instead of two or three large ones. · Wait until 48 hours after all symptoms have gone away before you have spicy foods, alcohol, and drinks that contain caffeine. · Do not eat foods that are high in fat. · Avoid anti-inflammatory medicines such as aspirin, ibuprofen (Advil, Motrin), and naproxen (Aleve). These can cause stomach upset. Talk to your doctor if you take daily aspirin for another health problem. When should you call for help? Call 911 anytime you think you may need emergency care. For example, call if:    · You passed out (lost consciousness).     · You pass maroon or very bloody stools.     · You vomit blood or what looks like coffee grounds.     · You have new, severe belly pain.    Call your doctor now or seek immediate medical care if:    · Your pain gets worse, especially if it becomes focused in one area of your belly.     · You have a new or higher fever.     · Your stools are black and look like tar, or they have streaks of blood.     · You have unexpected vaginal bleeding.     · You have symptoms of a urinary tract infection. These may include:  ? Pain when you urinate. ? Urinating more often than usual.  ? Blood in your urine.     · You are dizzy or lightheaded, or you feel like you may faint.    Watch closely for changes in your health, and be sure to contact your doctor if:    · You are not getting better after 1 day (24 hours). Where can you learn more? Go to http://cortney-williams.info/. Enter N562 in the search box to learn more about \"Abdominal Pain: Care Instructions. \"  Current as of: September 23, 2018  Content Version: 11.9  © 0587-7055 [employer].  Care instructions adapted under license by [employer] (which disclaims liability or warranty for this information). If you have questions about a medical condition or this instruction, always ask your healthcare professional. [address] any warranty or liability for your use of this information. Contusion: Care Instructions  Your Care Instructions    Contusion is the medical term for a bruise. It is the result of a direct blow or an impact, such as a fall. Contusions are common sports injuries. Most people think of a bruise as a black-and-blue spot. This happens when small blood vessels get torn and leak blood under the skin. But bones, muscles, and organs can also get bruised. This may damage deep tissues but not cause a bruise you can see. The doctor will do a physical exam to find the location of your contusion. You may also have tests to make sure you do not have a more serious injury, such as a broken bone or nerve damage. These may include X-rays or other imaging tests like a CT scan or MRI. Deep-tissue contusions may cause pain and swelling. But if there is no serious damage, they will often get better in a few weeks with home treatment. The doctor has checked you carefully, but problems can develop later. If you notice any problems or new symptoms, get medical treatment right away. Follow-up care is a key part of your treatment and safety. Be sure to make and go to all appointments, and call your doctor if you are having problems. It's also a good idea to know your test results and keep a list of the medicines you take. How can you care for yourself at home? · Put ice or a cold pack on the sore area for 10 to 20 minutes at a time to stop swelling. Put a thin cloth between the ice pack and your skin. · Be safe with medicines. Read and follow all instructions on the label. ? If the doctor gave you a prescription medicine for pain, take it as prescribed.   ? If you are not taking a prescription pain medicine, ask your doctor if you can take an over-the-counter medicine. · If you can, prop up the sore area on pillows as much as possible for the next few days. Try to keep the sore area above the level of your heart. When should you call for help? Call your doctor now or seek immediate medical care if:    · Your pain gets worse.     · You have new or worse swelling.     · You have tingling, weakness, or numbness in the area near the contusion.     · The area near the contusion is cold or pale.    Watch closely for changes in your health, and be sure to contact your doctor if:    · You do not get better as expected. Where can you learn more? Go to http://cortney-williams.info/. Enter S043 in the search box to learn more about \"Contusion: Care Instructions. \"  Current as of: September 23, 2018  Content Version: 11.9  © 2243-4245 [employer]. Care instructions adapted under license by [employer] (which disclaims liability or warranty for this information). If you have questions about a medical condition or this instruction, always ask your healthcare professional. [place] [address] any warranty or liability for your use of this information. We hope that we have addressed all of your medical concerns. The examination and treatment you received in the Emergency Department were for an emergent problem and were not intended as complete care. It is important that you follow up with your healthcare provider(s) for ongoing care. If your symptoms worsen or do not improve as expected, and you are unable to reach your usual health care provider(s), you should return to the Emergency Department. Today's healthcare is undergoing tremendous change, and patient satisfaction surveys are one of the many tools to assess the quality of medical care.   You may receive a survey from the [employer] regarding your experience in the Emergency Department. I hope that your experience has been completely positive, particularly the medical care that I provided. As such, please participate in the survey; anything less than excellent does not meet my expectations or intentions. [facility] and [facility] participate in nationally recognized quality of care measures. If your blood pressure is greater than 120/80, as reported below, we urge that you seek medical care to address the potential of high blood pressure, commonly known as hypertension. Hypertension can be hereditary or can be caused by certain medical conditions, pain, stress, or \"white coat syndrome. \"       Please make an appointment with your health care provider(s) for follow up of your Emergency Department visit. VITALS:   Patient Vitals for the past 8 hrs:   Temp Pulse Resp BP SpO2   05/29/19 1313 98.5 °F (36.9 °C) 64 16 146/67 98 %          Thank you for allowing us to provide you with medical care today. We realize that you have many choices for your emergency care needs. Please choose us in the future for any continued health care needs. Dony Subramanian Bee, [address] [place] [address] 20.   Office: [phone]            Recent Results (from the past 24 hour(s))   URINALYSIS W/ RFLX MICROSCOPIC    Collection Time: 05/29/19  1:21 PM   Result Value Ref Range    Color YELLOW/STRAW      Appearance CLEAR CLEAR      Specific gravity 1.012 1.003 - 1.030      pH (UA) 7.0 5.0 - 8.0      Protein NEGATIVE  NEG mg/dL    Glucose NEGATIVE  NEG mg/dL    Ketone NEGATIVE  NEG mg/dL    Bilirubin NEGATIVE  NEG      Blood NEGATIVE  NEG      Urobilinogen 0.2 0.2 - 1.0 EU/dL    Nitrites NEGATIVE  NEG      Leukocyte Esterase NEGATIVE  NEG     HCG URINE, QL. - POC    Collection Time: 05/29/19  1:25 PM   Result Value Ref Range    Pregnancy test,urine (POC) NEGATIVE  NEG     CBC WITH AUTOMATED DIFF    Collection Time: 05/29/19  1:44 PM   Result Value Ref Range    WBC 9.4 3.6 - 11.0 K/uL    RBC 5.03 3.80 - 5.20 M/uL    HGB 13.7 11.5 - 16.0 g/dL    HCT 43.2 35.0 - 47.0 %    MCV 85.9 80.0 - 99.0 FL    MCH 27.2 26.0 - 34.0 PG    MCHC 31.7 30.0 - 36.5 g/dL    RDW 14.5 11.5 - 14.5 %    PLATELET 765 (H) 878 - 400 K/uL    MPV 9.6 8.9 - 12.9 FL    NRBC 0.0 0  WBC    ABSOLUTE NRBC 0.00 0.00 - 0.01 K/uL    NEUTROPHILS 62 32 - 75 %    LYMPHOCYTES 28 12 - 49 %    MONOCYTES 6 5 - 13 %    EOSINOPHILS 3 0 - 7 %    BASOPHILS 1 0 - 1 %    IMMATURE GRANULOCYTES 0 0.0 - 0.5 %    ABS. NEUTROPHILS 5.9 1.8 - 8.0 K/UL    ABS. LYMPHOCYTES 2.6 0.8 - 3.5 K/UL    ABS. MONOCYTES 0.5 0.0 - 1.0 K/UL    ABS. EOSINOPHILS 0.3 0.0 - 0.4 K/UL    ABS. BASOPHILS 0.1 0.0 - 0.1 K/UL    ABS. IMM. GRANS. 0.0 0.00 - 0.04 K/UL    DF AUTOMATED     METABOLIC PANEL, COMPREHENSIVE    Collection Time: 05/29/19  1:44 PM   Result Value Ref Range    Sodium 138 136 - 145 mmol/L    Potassium 4.3 3.5 - 5.1 mmol/L    Chloride 111 (H) 97 - 108 mmol/L    CO2 24 21 - 32 mmol/L    Anion gap 3 (L) 5 - 15 mmol/L    Glucose 85 65 - 100 mg/dL    BUN 6 6 - 20 MG/DL    Creatinine 0.58 0.55 - 1.02 MG/DL    BUN/Creatinine ratio 10 (L) 12 - 20      GFR est AA >60 >60 ml/min/1.73m2    GFR est non-AA >60 >60 ml/min/1.73m2    Calcium 8.7 8.5 - 10.1 MG/DL    Bilirubin, total 0.3 0.2 - 1.0 MG/DL    ALT (SGPT) 13 12 - 78 U/L    AST (SGOT) 12 (L) 15 - 37 U/L    Alk. phosphatase 114 45 - 117 U/L    Protein, total 7.3 6.4 - 8.2 g/dL    Albumin 3.9 3.5 - 5.0 g/dL    Globulin 3.4 2.0 - 4.0 g/dL    A-G Ratio 1.1 1.1 - 2.2         Us Transvaginal    Result Date: 5/29/2019  INDICATION: Pelvic pain on the left COMPARISON: none The patient was studied with real-time ultrasound using transvaginal and transvesicle techniques. Transabdominal: Uterus: The uterus is anteverted and contains an IUD. The uterus measures 8.5 x 3.8 x 5.1 cm. Ovaries:   There is a cyst in the right ovary which measures 1.6 x 2.1 x 1.7 cm. The overall size of the right ovary is 2.9 x 2.7 x 2.8 cm. Doppler flow noted in the right ovary. The left ovary is elliptical and contain small follicles. The left ovary measures 2.0 x 1.9 x 2.0 cm. Doppler flow is noted. No abnormal fluid collection is seen in the cul-de-sac. Transvaginal: Transvaginal exam was performed to better evaluate the endometrial stripe and ovaries. Uterus: The uterus is normal in shape and echotexture measuring 7.9 x 3.9 x 5.2 cm. IUD is noted within the uterus. Ovaries: The right ovary contains small follicles as well as a cyst which measures 2.3 x 1.8 x 2.5 cm. There is Doppler flow within the solid portion of the right ovary. The left ovary is elliptical and contain small follicles. It measures 2.1 x 2.7 x 2.6 cm. Doppler flow is noted within it. IMPRESSION:  There is a small cyst in the right ovary. Study is otherwise unremarkable. Us Pelv Non Obs    Result Date: 5/29/2019  INDICATION: Pelvic pain on the left COMPARISON: none The patient was studied with real-time ultrasound using transvaginal and transvesicle techniques. Transabdominal: Uterus: The uterus is anteverted and contains an IUD. The uterus measures 8.5 x 3.8 x 5.1 cm. Ovaries: There is a cyst in the right ovary which measures 1.6 x 2.1 x 1.7 cm. The overall size of the right ovary is 2.9 x 2.7 x 2.8 cm. Doppler flow noted in the right ovary. The left ovary is elliptical and contain small follicles. The left ovary measures 2.0 x 1.9 x 2.0 cm. Doppler flow is noted. No abnormal fluid collection is seen in the cul-de-sac. Transvaginal: Transvaginal exam was performed to better evaluate the endometrial stripe and ovaries. Uterus: The uterus is normal in shape and echotexture measuring 7.9 x 3.9 x 5.2 cm. IUD is noted within the uterus. Ovaries: The right ovary contains small follicles as well as a cyst which measures 2.3 x 1.8 x 2.5 cm. There is Doppler flow within the solid portion of the right ovary.  The left ovary is elliptical and contain small follicles. It measures 2.1 x 2.7 x 2.6 cm. Doppler flow is noted within it. IMPRESSION:  There is a small cyst in the right ovary. Study is otherwise unremarkable.

## 2019-05-29 NOTE — ED PROVIDER NOTES
21 y.o. female with past medical history significant for Anxiety, Migraines, and Hypothyroidism who presents from home with chief complaint of abdominal pain. Patient states yesterday afternoon while at work she had onset of LLQ abdominal pain described as \"cramping\" that resolved, and returned early this morning with accompanying nausea and vomiting. Patient presents to [facility] ED today with persisting LLQ pain described as \"cramping. \" Patient states she \"bumped into something\" sometime over the past few days with resulting bruise over her LLQ. Patient notes \"VSD repair\" at 6 months old. Patient denies taking blood thinner. Patient denies blood in stool, or fever. There are no other acute medical concerns at this time. Note written by Mary Beth Bauman, as dictated by Sinan Hanson MD 1:34 PM        The history is provided by the patient.         Past Medical History:   Diagnosis Date    Abnormal Pap smear     2013    Abnormal Papanicolaou smear of cervix     follow up normal    Anxiety     Fatigue     Heart abnormality     Memory loss     Neurological disorder     migraines    Other ill-defined conditions(799.89)     Bladder reflux    Pap smear for cervical cancer screening 7/10/17 neg    Ringing in ears     Thyroid activity decreased     Thyroid disease     hypothyroidism in first pregnancy    VSD (ventricular septal defect)        Past Surgical History:   Procedure Laterality Date    CARDIAC SURG PROCEDURE UNLIST      VSD repair    HX OTHER SURGICAL           Family History:   Problem Relation Age of Onset    Cancer Maternal Grandmother         lung cancer    Diabetes Maternal Grandmother     Hypertension Maternal Grandmother     Breast Cancer Other        Social History     Socioeconomic History    Marital status: SINGLE     Spouse name: Not on file    Number of children: Not on file    Years of education: Not on file    Highest education level: Not on file   Occupational History    Not on file   Social Needs    Financial resource strain: Not on file    Food insecurity:     Worry: Not on file     Inability: Not on file    Transportation needs:     Medical: Not on file     Non-medical: Not on file   Tobacco Use    Smoking status: Current Every Day Smoker     Packs/day: 1.00    Smokeless tobacco: Current User   Substance and Sexual Activity    Alcohol use: No    Drug use: No    Sexual activity: Yes     Partners: Male   Lifestyle    Physical activity:     Days per week: Not on file     Minutes per session: Not on file    Stress: Not on file   Relationships    Social connections:     Talks on phone: Not on file     Gets together: Not on file     Attends Yazidism service: Not on file     Active member of club or organization: Not on file     Attends meetings of clubs or organizations: Not on file     Relationship status: Not on file    Intimate partner violence:     Fear of current or ex partner: Not on file     Emotionally abused: Not on file     Physically abused: Not on file     Forced sexual activity: Not on file   Other Topics Concern    Not on file   Social History Narrative    ** Merged History Encounter **              ALLERGIES: Patient has no known allergies. Review of Systems   Constitutional: Negative for chills and fever. Respiratory: Negative for cough and shortness of breath. Cardiovascular: Negative for chest pain. Gastrointestinal: Positive for abdominal pain, nausea and vomiting. Negative for diarrhea. Genitourinary: Negative for difficulty urinating and dysuria. Skin: Positive for color change (bruising). All other systems reviewed and are negative.       Vitals:    05/29/19 1313   BP: 146/67   Pulse: 64   Resp: 16   Temp: 98.5 °F (36.9 °C)   SpO2: 98%   Weight: 66.7 kg (147 lb)            Physical Exam   Physical Examination: General appearance - alert, well appearing, and in no distress, oriented to person, place, and time and normal appearing weight  Eyes - pupils equal and reactive, extraocular eye movements intact  Neck - supple, no significant adenopathy  Chest - clear to auscultation, no wheezes, rales or rhonchi, symmetric air entry  Heart - normal rate, regular rhythm, normal S1, S2, no murmurs, rubs, clicks or gallops  Abdomen - soft, focal tenderness over small bruise to left lower abdomen, no rebound/guarding/peritoneal signs, nondistended, no masses or organomegaly  Back exam - full range of motion, no tenderness, palpable spasm or pain on motion  Neurological - alert, oriented, normal speech, no focal findings or movement disorder noted  Musculoskeletal - no joint tenderness, deformity or swelling  Extremities - peripheral pulses normal, no pedal edema, no clubbing or cyanosis  Skin - normal coloration and turgor, no rashes, no suspicious skin lesions noted  MDM  Number of Diagnoses or Management Options  Contusion of abdominal wall, initial encounter:   Cyst of right ovary:      Amount and/or Complexity of Data Reviewed  Clinical lab tests: ordered and reviewed  Tests in the radiology section of CPT®: ordered and reviewed  Obtain history from someone other than the patient: yes (family)  Independent visualization of images, tracings, or specimens: yes    Patient Progress  Patient progress: stable         Procedures  Labs WNL. Pt c/o pelvic pain below bruise. Will obtain ultrasound. US WNL. Will d/c with pcp f/u. Return to ED for worsening symptoms. VSS.

## 2020-03-12 ENCOUNTER — ED HISTORICAL/CONVERTED ENCOUNTER (OUTPATIENT)
Dept: OTHER | Age: 25
End: 2020-03-12

## 2020-03-19 ENCOUNTER — OP HISTORICAL/CONVERTED ENCOUNTER (OUTPATIENT)
Dept: OTHER | Age: 25
End: 2020-03-19

## 2020-07-14 ENCOUNTER — TELEPHONE (OUTPATIENT)
Dept: NEUROLOGY | Age: 25
End: 2020-07-14

## 2020-07-14 ENCOUNTER — OFFICE VISIT (OUTPATIENT)
Dept: NEUROLOGY | Age: 25
End: 2020-07-14

## 2020-07-14 VITALS
DIASTOLIC BLOOD PRESSURE: 72 MMHG | SYSTOLIC BLOOD PRESSURE: 128 MMHG | HEART RATE: 68 BPM | HEIGHT: 62 IN | OXYGEN SATURATION: 99 % | BODY MASS INDEX: 32.02 KG/M2 | TEMPERATURE: 98.6 F | WEIGHT: 174 LBS

## 2020-07-14 DIAGNOSIS — R20.0 BILATERAL NUMBNESS AND TINGLING OF ARMS AND LEGS: ICD-10-CM

## 2020-07-14 DIAGNOSIS — R20.2 BILATERAL NUMBNESS AND TINGLING OF ARMS AND LEGS: ICD-10-CM

## 2020-07-14 DIAGNOSIS — M54.2 NECK PAIN: ICD-10-CM

## 2020-07-14 DIAGNOSIS — G43.411 INTRACTABLE HEMIPLEGIC MIGRAINE WITH STATUS MIGRAINOSUS: Primary | ICD-10-CM

## 2020-07-14 DIAGNOSIS — G43.411 INTRACTABLE HEMIPLEGIC MIGRAINE WITH STATUS MIGRAINOSUS: ICD-10-CM

## 2020-07-14 RX ORDER — PROPRANOLOL HYDROCHLORIDE 160 MG/1
CAPSULE, EXTENDED RELEASE ORAL
COMMUNITY
Start: 2020-06-11 | End: 2020-07-14

## 2020-07-14 RX ORDER — RIZATRIPTAN BENZOATE 10 MG/1
TABLET, ORALLY DISINTEGRATING ORAL
COMMUNITY
Start: 2020-07-09 | End: 2020-07-14 | Stop reason: ALTCHOICE

## 2020-07-14 RX ORDER — NAPROXEN 500 MG/1
TABLET ORAL
COMMUNITY
Start: 2020-05-08 | End: 2020-07-14

## 2020-07-14 RX ORDER — TOPIRAMATE 25 MG/1
TABLET ORAL
COMMUNITY
Start: 2020-06-11 | End: 2020-07-20 | Stop reason: ALTCHOICE

## 2020-07-14 RX ORDER — SUMATRIPTAN 20 MG/1
SPRAY NASAL
COMMUNITY
Start: 2020-05-14 | End: 2020-07-14 | Stop reason: ALTCHOICE

## 2020-07-14 RX ORDER — RIMEGEPANT SULFATE 75 MG/75MG
TABLET, ORALLY DISINTEGRATING ORAL
Qty: 8 TAB | Refills: 3 | Status: SHIPPED | OUTPATIENT
Start: 2020-07-14 | End: 2020-08-24 | Stop reason: SDUPTHER

## 2020-07-14 RX ORDER — OMEPRAZOLE 40 MG/1
CAPSULE, DELAYED RELEASE ORAL
COMMUNITY
Start: 2020-07-08 | End: 2020-12-11 | Stop reason: ALTCHOICE

## 2020-07-14 RX ORDER — GALCANEZUMAB 120 MG/ML
240 INJECTION, SOLUTION SUBCUTANEOUS ONCE
Qty: 2 ML | Refills: 0 | Status: SHIPPED | COMMUNITY
Start: 2020-07-14 | End: 2020-07-14

## 2020-07-14 RX ORDER — GALCANEZUMAB 120 MG/ML
120 INJECTION, SOLUTION SUBCUTANEOUS
Qty: 1 ML | Refills: 3 | Status: SHIPPED | OUTPATIENT
Start: 2020-07-14 | End: 2020-08-24 | Stop reason: SDUPTHER

## 2020-07-14 NOTE — PROGRESS NOTES
[facility] and [address] at [facility] at [address] [facility], [address] [place] [address] [address] [facility], [address] [place] [address]  [phone] Office  [phone] Facsimile           Referring: Self    Chief Complaint   Patient presents with    Headache     Patient states she is heaving headaches 2 to 3 times a week and she has intermittent \"nausea, light sensitivity, sound sensitivity just not at the same time. \" Patient is currently Topomax, Imitrex nasal spray, and propanolol     Subjective:  79-year-old woman, who comes today for evaluation of headaches, as well as neck pain and numbness in her upper extremities. In terms of her headaches, she has had them for a number of years. They typically are right orbital with associated photophobia, phonophobia and nausea. Typically does not vomit, but she has. They last for several hours. In the past she has had episodes of right sided hemibody numbness and weakness with these migraines. She has diagnosis of hemiplegic migraine. She saw me actually several years back and we had her on some preventives. We were using Cambia for abortive therapy. We were avoiding vasoactive substances secondary to the hemiplegic nature of her migraine. In any regard, she started to see Dr. Elvira Romberg down in [place] as that is closer to where she lives in [place] [address]. She has had increasing migraine frequency. She was put on Inderal with increasing dosages. She was put back on Topiramate and changed over to Trokendi, which is the extended release form secondary to intolerable side effects. She notes that she continues to get about four headaches per week, lasting multiple hours at a time. No focal deficits with them. She has the associated symptoms as noted. She has been given Imitrex and Maxalt again and those are not effective. No side effects.   No chest pain, palpitations, shortness of breath, nausea, vomiting, except in the context of a headache. She has a new complaint as well of pain in her neck, right sided more than left. She has numbness and tingling in her upper extremities. She has had x-rays of her spine and no abnormality was found there. She has not had EMG. No MRI. She notes that it increases with activity. All of her fingers are numb. No weakness. Not dropping things. She also has pain between the shoulder blades that comes on when she is at work and is really described more as a spasm. No difficulty with bowel or bladder. No falls. No recent illness. No rash. Review of the electronic medical record finds the patient actually was seen by me back in 2014. Last office visit was September 8, 2014, and at that time she was following up for migraine. She had just given birth and used magnesium during her pregnancy as a preventive. She was having three-four headaches per month, lasting all day, and she was not able to use tryptans or DHE secondary to the fact that she has complicated migraine/hemiplegic migraine. She would get right sided numbness and weakness. At that time her examination was unremarkable. We started Elavil 20 mg h.s. and gave her some Cambia. She was also having symptoms of lumbar radiculopathy and we sent her for MRI of the lumbar spine. In reviewing the imaging, it does not appear as though that was done.       Past Medical History:   Diagnosis Date    Abnormal Pap smear     2013    Abnormal Papanicolaou smear of cervix     follow up normal    Anxiety     Fatigue     Heart abnormality     Memory loss     Neurological disorder     migraines    Other ill-defined conditions(799.89)     Bladder reflux    Pap smear for cervical cancer screening 7/10/17 neg    Ringing in ears     Thyroid activity decreased     Thyroid disease     hypothyroidism in first pregnancy    VSD (ventricular septal defect) Past Surgical History:   Procedure Laterality Date    CARDIAC SURG PROCEDURE UNLIST      VSD repair    HX OTHER SURGICAL         Current Outpatient Medications   Medication Sig Dispense Refill    omeprazole (PRILOSEC) 40 mg capsule       rizatriptan (MAXALT-MLT) 10 mg disintegrating tablet       SUMAtriptan (IMITREX) 20 mg/actuation nasal spray       topiramate (TOPAMAX) 25 mg tablet       acetaminophen (TYLENOL) 325 mg tablet Take  by mouth every four (4) hours as needed for Pain.  calcium carbonate (TUMS) 200 mg calcium (500 mg) chew Take 1 Tab by mouth daily. No Known Allergies    Social History     Tobacco Use    Smoking status: Former Smoker     Packs/day: 1.00     Last attempt to quit: 2020     Years since quittin.1    Smokeless tobacco: Current User   Substance Use Topics    Alcohol use: No    Drug use: No       Family History   Problem Relation Age of Onset    Cancer Maternal Grandmother         lung cancer    Diabetes Maternal Grandmother     Hypertension Maternal Grandmother     Breast Cancer Other        Review of Systems  Pertinent positives and negatives as noted with remainder of comprehensive review negative    Examination  Visit Vitals  /72 (BP 1 Location: Left arm)   Pulse 68   Temp 98.6 °F (37 °C)   Ht 5' 2\" (1.575 m)   Wt 78.9 kg (174 lb)   SpO2 99%   BMI 31.83 kg/m²     Pleasant, well appearing. Dress and grooming are appropriate. No scleral icterus is present. Oropharynx is clear. Supple neck without bruit appreciated. Heart regular. Pulses are symmetrical.  No edema in the lower extremities. Neurologically, she is awake, alert, and oriented with normal speech and language. Her cognition is normal.    Intact cranial nerves 2-12. No nystagmus. Visual fields full to confrontation. Disk margins are flat bilaterally. She has normal bulk and tone. She has no abnormal movement. She has no pronation or drift.   She generates full strength in the upper and lower extremities to direct confrontational testing. Reflexes are symmetrical in the upper and lower extremities bilaterally. No pathologic reflexes are elicited. Finger nose finger and rapid alternating movements are normal.  Steady gait. No sensory deficit to primary modalities. Impression/Plan  Intractable migraine headaches with history of hemiplegic type migraine and secondary to this vasoactive substances such as tryptans or DHE are contraindicated. For that reason for abortive therapy we will use Nurtec 75 mg, one at onset, repeat in 24 hours if needed. Discussed the studies, expectations, side effect potential, etc.  Track headaches on the calendar. Her current preventive medications are not working and we will use CGRP inhibition. Start Emgality today in a customary fashion. Loading dose was given in the office today and we discussed this is an injection. She will continue with 120 mg monthly. Again discussed side effects, expectations, etc. of this medication as well. In terms of the neck pain and numbness in the upper extremities, we will get an MRI of the cervical spine. She will also get an EMG of the upper extremities. She notes that when the neck pain is increased, she gets dizziness described as lightheadedness, and therefore we will check a carotid doppler to ensure no vascular compromise. She will follow at the conclusion of her testing. Syed Starks MD    This note was created using voice recognition software. Despite editing, there may be syntax errors. This note will not be viewable in [address]

## 2020-07-29 ENCOUNTER — OFFICE VISIT (OUTPATIENT)
Dept: NEUROLOGY | Age: 25
End: 2020-07-29

## 2020-07-29 VITALS — TEMPERATURE: 98.1 F

## 2020-07-29 DIAGNOSIS — M54.2 NECK PAIN: ICD-10-CM

## 2020-07-29 DIAGNOSIS — R20.2 BILATERAL NUMBNESS AND TINGLING OF ARMS AND LEGS: Primary | ICD-10-CM

## 2020-07-29 DIAGNOSIS — R20.0 BILATERAL NUMBNESS AND TINGLING OF ARMS AND LEGS: Primary | ICD-10-CM

## 2020-07-29 NOTE — PROCEDURES
EMG/ NCS Report  [facility]  - [facility]  [address] [facility], [address] [place] [address] [address]   Ph: [phone]   FAX: [phone]/ [phone]  Test Date:  2020    Patient: Raul Valdovinos : 1995 Physician: Callie Bangura MD   ID#: [id number] SEX: Female Ref. Phys: Ardeth Heimlich MD     Patient History / Exam:  Patient complaining of neck pain and arm numbness/tingling. Exam was non-focal. Assess for neuropathy vs cervical radiculopathy. EMG & NCV Findings:  Sensory and motor nerve conduction studies (as indicated in the tables) were within reference of normal.  All left vs. right side differences were within normal limits. All F Wave latencies were within normal limits. All F Wave left vs. right side latency differences were within normal limits. Disposable concentric needle EMG (as indicated in the following table) showed no evidence of electrical instability. Impressions: This study is normal.  There is no electrodiagnostic evidence of an entrapment neuropathy, generalized neuropathy, myopathy or significant cervical radiculopathy at this time. Thank you for the consult.      Micah Hernandez MD    Nerve Conduction Studies  Anti Sensory Summary Table     Stim Site NR Peak (ms) Norm Peak (ms) P-T Amp (µV) Norm P-T Amp Site1 Site2 Dist (cm)   Left Median Anti Sensory (2nd Digit)  29°C   Wrist    2.8 <4 105.1 >13 Wrist 2nd Digit 14.0   Right Median Anti Sensory (2nd Digit)  26.6°C   Wrist    2.8 <4 85.2 >13 Wrist 2nd Digit 14.0   Left Radial Anti Sensory (Base 1st Digit)  30°C   Wrist    2.1 <2.8 69.7 >11 Wrist Base 1st Digit 10.0   Right Radial Anti Sensory (Base 1st Digit)  29.2°C   Wrist    2.1 <2.8 51.1 >11 Wrist Base 1st Digit 10.0   Left Ulnar Anti Sensory (5th Digit)  29.4°C   Wrist    2.8 <4.0 65.2 >9 Wrist 5th Digit 14.0   Right Ulnar Anti Sensory (5th Digit)  28.6°C   Wrist    2.8 <4.0 57.0 >9 Wrist 5th Digit 14.0     Motor Summary Table     Stim Site NR Onset (ms) Norm Onset (ms) O-P Amp (mV) Norm O-P Amp Amp (Prev) (%) Site1 Site2 Dist (cm) Denilson (m/s) Norm Denilson (m/s)   Left Median Motor (Abd Poll Brev)  30.1°C   Wrist    3.3 <4.5 9.6 >4.1 100.0 Wrist Abd Poll Brev 8.0     Elbow    6.5  9.3  96.9 Elbow Wrist 18.0 56 >49   Right Median Motor (Abd Poll Brev)  29.7°C   Wrist    3.4 <4.5 11.1 >4.1 100.0 Wrist Abd Poll Brev 8.0     Elbow    6.6  11.0  99.1 Elbow Wrist 19.0 59 >49   Left Ulnar Motor (Abd Dig Minimi)  29.9°C   Wrist    2.4 <3.7 11.1 >7.9 100.0 Wrist Abd Dig Minimi 8.0     B Elbow    5.5  10.9  98.2 B Elbow Wrist 18.0 58 >52   A Elbow    7.3  9.1  83.5 A Elbow B Elbow 10.0 56 >43   Right Ulnar Motor (Abd Dig Minimi)  30.3°C   Wrist    2.5 <3.7 11.1 >7.9 100.0 Wrist Abd Dig Minimi 8.0     B Elbow    5.5  10.9  98.2 B Elbow Wrist 18.0 60 >52   A Elbow    7.2  10.2  93.6 A Elbow B Elbow 10.0 59 >43     Comparison Summary Table     Stim Site NR Peak (ms) P-T Amp (µV) Site1 Site2 Dist (cm) Delta-0 (ms)   Left Median/Ulnar Palm Comparison (Wrist)  30.3°C   Median Palm    1.7 112.4 Median Palm Ulnar Palm 8.0 0.3   Ulnar Palm    1.6 30.8       Right Median/Ulnar Palm Comparison (Wrist)  30.7°C   Median Palm    1.9 42.2 Median Palm Ulnar Palm 8.0 0.3   Ulnar Palm    1.7 28.9         F Wave Studies     NR F-Lat (ms) Lat Norm (ms) L-R F-Lat (ms) L-R Lat Norm   Left Ulnar (Mrkrs) (Abd Dig Min)  29.9°C      23.06 <32 0.00 <2.5   Right Ulnar (Mrkrs) (Abd Dig Min)  30.4°C      23.06 <32 0.00 <2.5       EMG     Side Muscle Nerve Root Ins Act Fibs Psw Recrt Duration Amp Poly Comment   Left Deltoid Axillary C5-6 Nml Nml Nml Nml Nml Nml Nml    Left Triceps Radial C6-7-8 Nml Nml Nml Nml Nml Nml Nml    Left Biceps Musculocut C5-6 Nml Nml Nml Nml Nml Nml Nml    Left FlexCarRad Median C6-7 Nml Nml Nml Nml Nml Nml Nml    Left 1stDorInt Ulnar C8-T1 Nml Nml Nml Nml Nml Nml Nml    Left Lower Cerv Parasp Rami C7,T1 Nml Nml Nml Nml Nml Nml Nml    Left Mid Cerv Parasp Rami C5,6 Nml Nml Nml Nml Nml Nml Nml    Right Deltoid Axillary C5-6 Nml Nml Nml Nml Nml Nml Nml    Right Triceps Radial C6-7-8 Nml Nml Nml Nml Nml Nml Nml    Right Biceps Musculocut C5-6 Nml Nml Nml Nml Nml Nml Nml    Right FlexCarRad Median C6-7 Nml Nml Nml Nml Nml Nml Nml    Right 1stDorInt Ulnar C8-T1 Nml Nml Nml Nml Nml Nml Nml    Right Lower Cerv Parasp Rami C7,T1 Nml Nml Nml Nml Nml Nml Nml    Right Mid Cerv Parasp Rami C5,6 Nml Nml Nml Nml Nml Nml Nml      Waveforms:

## 2020-08-05 NOTE — TELEPHONE ENCOUNTER
Prior Authorization submitted URGENTLY for Emgality & Nurtec to [facility] via Fax. Status Pending. Allow 24 hours for response.

## 2020-08-05 NOTE — TELEPHONE ENCOUNTER
Prior authorization APPROVED for [facility] by [facility]. Effective dates 08/05/20 - 11/05/21. Case #(use patient ID). Approval will be scanned into media.  Pharmacy has been notified of approval.

## 2020-08-06 NOTE — TELEPHONE ENCOUNTER
[facility]. Denial reason states:    Patient must have a trial and failure of OR contraindication to at least TWO of the following meds: sumatriptan (patient has tried this), rizatripotan, venlafaxine, divalproex, metoprolol. Can appeal decision, or provider can prescribe one more formulary alternative. Denial scanned into media for review.

## 2020-08-19 ENCOUNTER — TELEPHONE (OUTPATIENT)
Dept: NEUROLOGY | Age: 25
End: 2020-08-19

## 2020-08-19 DIAGNOSIS — G43.411 INTRACTABLE HEMIPLEGIC MIGRAINE WITH STATUS MIGRAINOSUS: Primary | ICD-10-CM

## 2020-08-19 NOTE — TELEPHONE ENCOUNTER
Appeal submitted as URGENT for Nurtec to [facility] via Fax. Status Pending. If no response by 08/24/20, will call insurance for an update.

## 2020-08-20 NOTE — TELEPHONE ENCOUNTER
Prior [facility] APPEAL [facility]ED [facility] via Fax from insurance. Effective dates (none given). Case #(use patient ID). Approval will be scanned into media.  Pharmacy and patient notified of approval.

## 2020-08-24 RX ORDER — GALCANEZUMAB 120 MG/ML
120 INJECTION, SOLUTION SUBCUTANEOUS
Qty: 1 ML | Refills: 3 | Status: SHIPPED | OUTPATIENT
Start: 2020-08-24 | End: 2020-12-11

## 2020-08-24 RX ORDER — RIMEGEPANT SULFATE 75 MG/75MG
TABLET, ORALLY DISINTEGRATING ORAL
Qty: 8 TAB | Refills: 3 | Status: SHIPPED | OUTPATIENT
Start: 2020-08-24 | End: 2020-12-11

## 2020-08-31 ENCOUNTER — DOCUMENTATION ONLY (OUTPATIENT)
Dept: NEUROLOGY | Age: 25
End: 2020-08-31

## 2020-10-15 ENCOUNTER — OFFICE VISIT (OUTPATIENT)
Dept: NEUROLOGY | Age: 25
End: 2020-10-15
Payer: MEDICAID

## 2020-10-15 VITALS
BODY MASS INDEX: 31.73 KG/M2 | SYSTOLIC BLOOD PRESSURE: 102 MMHG | TEMPERATURE: 97 F | OXYGEN SATURATION: 99 % | HEART RATE: 58 BPM | HEIGHT: 62 IN | DIASTOLIC BLOOD PRESSURE: 60 MMHG | WEIGHT: 172.4 LBS | RESPIRATION RATE: 14 BRPM

## 2020-10-15 DIAGNOSIS — R20.0 BILATERAL NUMBNESS AND TINGLING OF ARMS AND LEGS: ICD-10-CM

## 2020-10-15 DIAGNOSIS — G43.411 INTRACTABLE HEMIPLEGIC MIGRAINE WITH STATUS MIGRAINOSUS: Primary | ICD-10-CM

## 2020-10-15 DIAGNOSIS — R20.2 BILATERAL NUMBNESS AND TINGLING OF ARMS AND LEGS: ICD-10-CM

## 2020-10-15 DIAGNOSIS — M54.6 CHRONIC MIDLINE THORACIC BACK PAIN: ICD-10-CM

## 2020-10-15 DIAGNOSIS — M79.672 PAIN IN BOTH FEET: ICD-10-CM

## 2020-10-15 DIAGNOSIS — M79.671 PAIN IN BOTH FEET: ICD-10-CM

## 2020-10-15 DIAGNOSIS — G89.29 CHRONIC MIDLINE THORACIC BACK PAIN: ICD-10-CM

## 2020-10-15 PROCEDURE — 99214 OFFICE O/P EST MOD 30 MIN: CPT | Performed by: PSYCHIATRY & NEUROLOGY

## 2020-10-15 NOTE — PROGRESS NOTES
Chief Complaint   Patient presents with    Migraine     1-2/30 HA days     Last Emgality dose was 10/15/20. Ran out of Trokendi in Aug (this was sent end of Aug but pt did not get message).   Does not notice change since not taking

## 2020-10-15 NOTE — PROGRESS NOTES
[facility] and  [address] at [facility] at [address] [facility], [address] [facility] [address] [facility], [address] [place] [address]   [phone]              Chief Complaint   Patient presents with    Migraine     Current Outpatient Medications   Medication Sig Dispense Refill    galcanezumab-gnlm (Emgality Pen) 120 mg/mL injection 1 mL by SubCUTAneous route every thirty (30) days. 1 mL 3    rimegepant (Nurtec ODT) 75 mg disintegrating tablet 1 po at migraine onset. Max 1 tab/24 hours 8 Tab 3    propranolol LA (INDERAL LA) 160 mg capsule Take 1 Cap by mouth daily. 30 Cap 3    omeprazole (PRILOSEC) 40 mg capsule       acetaminophen (TYLENOL) 325 mg tablet Take  by mouth every four (4) hours as needed for Pain.  calcium carbonate (TUMS) 200 mg calcium (500 mg) chew Take 1 Tab by mouth daily.  topiramate ER (Trokendi XR) 25 mg capsule Take 1 Cap by mouth daily. 30 Cap 3      No Known Allergies  Social History     Tobacco Use    Smoking status: Former Smoker     Packs/day: 1.00     Last attempt to quit: 2020     Years since quittin.4    Smokeless tobacco: Current User   Substance Use Topics    Alcohol use: No    Drug use: No     74-year-old woman returns today for follow-up intractable migraine headaches. She has been on [facility] and [facility]. She ran out of her Trokendi. Not noticed a change in her headache frequency of that. Uses Nurtec for abortive therapy    At her last visit she had an EMG performed secondary to complaints of numbness and tingling in the arms. This was normal.  She continues to have dysesthesias in her hands and her feet. Describes tingling. She also has tingling in the feet and numbness. Worse at work. Has some pain as well. Also complaining of mid thoracic pain of pain between the shoulder blades. She relates it to starting propranolol.   She asked about chiropractic. No focal weakness. No injury. No fall. No recent illness. In terms of the migraines she has had to use the Nurtec 3 times since starting Emgality. She is off the Trokendi. Nurtec works well for her. Takes the headache away quickly    Review of systems  Pertinent positives and negatives as noted with remainder of comprehensive review negative    Examination  Visit Vitals  /60 (BP 1 Location: Left arm, BP Patient Position: Sitting)   Pulse (!) 58   Temp 97 °F (36.1 °C)   Resp 14   Ht 5' 2\" (1.575 m)   Wt 78.2 kg (172 lb 6.4 oz)   SpO2 99%   BMI 31.53 kg/m²     Awake alert oriented and conversant. Normal speech and language. Normal cognitive function. Cranial nerves intact. No motor focality. No ataxia. Steady gait    Impression/Plan  Intractable migraine headaches markedly better with Emgality. Continue this. Continue Nurtec. Stay off of Trokendi. We will try to wean off of Inderal as well taking that every other day for couple weeks and then stopping. Certainly if the headache frequency increases she can go back to that dose    Discomfort between the shoulder blades. Discussed her normal exam.  Discussed normal EMG. She asked about chiropractic and she will investigate that. Her  sees a chiropractor as well. Numbness and tingling in the bilateral lower extremities is the uppers despite normal EMG question small fiber neuropathy. She will get skin biopsy and ANS testing. Follow-up after her testing    Alix Hayes MD      This note was created using voice recognition software. Despite editing, there may be syntax errors. This note will not be viewable in [address]

## 2020-10-15 NOTE — LETTER
10/15/20 Patient: Leslie Alvarado YOB: 1995 Date of Visit: 10/15/2020 Brian Cline, [address] [address] VIA Facsimile: [phone] Dear Brian Cline DO, Thank you for referring Ms. Shilpa Cherry to [facility] for evaluation. My notes for this consultation are attached. If you have questions, please do not hesitate to call me. I look forward to following your patient along with you. Sincerely, Gregor Contreras MD

## 2020-10-16 ENCOUNTER — TELEPHONE (OUTPATIENT)
Dept: NEUROLOGY | Age: 25
End: 2020-10-16

## 2020-10-22 ENCOUNTER — PATIENT MESSAGE (OUTPATIENT)
Dept: NEUROLOGY | Age: 25
End: 2020-10-22

## 2020-10-22 ENCOUNTER — OFFICE VISIT (OUTPATIENT)
Dept: PODIATRY | Age: 25
End: 2020-10-22
Payer: MEDICAID

## 2020-10-22 VITALS
OXYGEN SATURATION: 98 % | WEIGHT: 171.6 LBS | DIASTOLIC BLOOD PRESSURE: 67 MMHG | HEART RATE: 68 BPM | TEMPERATURE: 96.2 F | BODY MASS INDEX: 31.58 KG/M2 | SYSTOLIC BLOOD PRESSURE: 103 MMHG | HEIGHT: 62 IN

## 2020-10-22 DIAGNOSIS — G62.9 NEUROPATHY: Primary | ICD-10-CM

## 2020-10-22 DIAGNOSIS — M79.672 PAIN IN BOTH FEET: Primary | ICD-10-CM

## 2020-10-22 DIAGNOSIS — M79.671 PAIN IN BOTH FEET: Primary | ICD-10-CM

## 2020-10-22 PROCEDURE — 99203 OFFICE O/P NEW LOW 30 MIN: CPT | Performed by: PODIATRIST

## 2020-10-22 RX ORDER — DULOXETIN HYDROCHLORIDE 60 MG/1
60 CAPSULE, DELAYED RELEASE ORAL DAILY
Qty: 30 CAP | Refills: 2 | Status: SHIPPED | OUTPATIENT
Start: 2020-10-22 | End: 2020-12-11

## 2020-10-23 ENCOUNTER — TELEPHONE (OUTPATIENT)
Dept: NEUROLOGY | Age: 25
End: 2020-10-23

## 2020-10-23 NOTE — TELEPHONE ENCOUNTER
From: Pawan Peck  To: Ty Roman MD  Sent: 10/22/2020 5:15 PM EDT  Subject: Visit Follow-Up Question    This is part question part referral request. I saw the podiatrist you referred me to today. All he did was put me on Cymbalta told me to come back in 3 months and if that didn't help he would then order xrays. Is there any way you could refer me to a different podiatrist? One that will actually look into what is causing my issues instead of just throwing another medication in the mix?

## 2020-10-28 NOTE — PROGRESS NOTES
[facility]    Subjective:         Patient is a 31 y.o. female who is being seen as a new pt for numbness to the bottom of both of her feet. Patient denies any trauma to the area. She states there is no overt pain but irritation rising to the level of 6 out of 10. She denies any breaks in her skin. She denies any local/systemic signs of infection. She denies any overt inciting events. She states the irritation is worse in the evenings. She denies any alleviating factors. She denies any other pedal complaints    Past Medical History:   Diagnosis Date    Abnormal Pap smear         Abnormal Papanicolaou smear of cervix     follow up normal    Anxiety     Fatigue     Heart abnormality     Memory loss     Neurological disorder     migraines    Other ill-defined conditions(799.89)     Bladder reflux    Pap smear for cervical cancer screening 7/10/17 neg    Ringing in ears     Thyroid activity decreased     Thyroid disease     hypothyroidism in first pregnancy    VSD (ventricular septal defect)      Past Surgical History:   Procedure Laterality Date    CARDIAC SURG PROCEDURE UNLIST      VSD repair    HX OTHER SURGICAL         Family History   Problem Relation Age of Onset    Cancer Maternal Grandmother         lung cancer    Diabetes Maternal Grandmother     Hypertension Maternal Grandmother     Breast Cancer Other       Social History     Tobacco Use    Smoking status: Former Smoker     Packs/day: 1.00     Last attempt to quit: 2020     Years since quittin.4    Smokeless tobacco: Current User   Substance Use Topics    Alcohol use: No     No Known Allergies  Prior to Admission medications    Medication Sig Start Date End Date Taking? Authorizing Provider   DULoxetine (CYMBALTA) 60 mg capsule Take 1 Cap by mouth daily.  10/22/20  Yes Saran Montano, MONSE   galcanezumab-gnlm (Emgality Pen) 120 mg/mL injection 1 mL by SubCUTAneous route every thirty (30) days. 8/24/20  Yes Jennyfer Wright MD   rimegepant (Nurtec ODT) 75 mg disintegrating tablet 1 po at migraine onset. Max 1 tab/24 hours 8/24/20  Yes Jennyfer Wright MD   propranolol LA (INDERAL LA) 160 mg capsule Take 1 Cap by mouth daily. 7/20/20  Yes Jennyfer Wright MD   acetaminophen (TYLENOL) 325 mg tablet Take  by mouth every four (4) hours as needed for Pain. Yes Provider, Historical   topiramate ER (Trokendi XR) 25 mg capsule Take 1 Cap by mouth daily. 8/28/20   Jennyfer Wright MD   omeprazole (PRILOSEC) 40 mg capsule  7/8/20   Provider, Historical   calcium carbonate (TUMS) 200 mg calcium (500 mg) chew Take 1 Tab by mouth daily. Provider, Historical       Review of Systems   Constitutional: Negative. Respiratory: Negative. Cardiovascular: Negative. Gastrointestinal: Negative. Endocrine: Negative. Genitourinary: Negative. Musculoskeletal: Negative. Allergic/Immunologic: Negative. Hematological: Negative. Psychiatric/Behavioral: Negative. All other systems reviewed and are negative. Objective:     Visit Vitals  /67 (BP 1 Location: Left arm, BP Patient Position: Sitting)   Pulse 68   Temp (!) 96.2 °F (35.7 °C) (Temporal)   Ht 5' 2\" (1.575 m)   Wt 171 lb 9.6 oz (77.8 kg)   SpO2 98%   BMI 31.39 kg/m²       Physical Exam  Vitals signs reviewed. Constitutional:       Appearance: She is obese. Cardiovascular:      Pulses:           Dorsalis pedis pulses are 2+ on the right side and 2+ on the left side. Posterior tibial pulses are 2+ on the right side and 2+ on the left side. Pulmonary:      Effort: Pulmonary effort is normal.   Musculoskeletal:      Right lower leg: No edema. Left lower leg: No edema. Right foot: Normal range of motion. No deformity or bunion. Left foot: Normal range of motion. No deformity or bunion. Feet:      Right foot:      Protective Sensation: 10 sites tested. 10 sites sensed.       Skin integrity: Skin integrity normal.      Toenail Condition: Right toenails are normal.      Left foot:      Protective Sensation: 10 sites tested. 10 sites sensed. Skin integrity: Skin integrity normal.      Toenail Condition: Left toenails are normal.   Lymphadenopathy:      Lower Body: No right inguinal adenopathy. No left inguinal adenopathy. Skin:     General: Skin is warm. Capillary Refill: Capillary refill takes 2 to 3 seconds. Neurological:      Mental Status: She is alert and oriented to person, place, and time. Psychiatric:         Mood and Affect: Mood and affect normal.         Behavior: Behavior is cooperative. Data Review: No results found for this or any previous visit (from the past 24 hour(s)). Impression:       ICD-10-CM ICD-9-CM    1. Neuropathy  G62.9 355.9          Recommendation:     Patient seen and evaluated in the office  Discussed and educated patient regarding her current medical condition  A prescription was given for Cymbalta 60 mg to be taken daily for symptomatic relief.

## 2020-10-30 ENCOUNTER — OFFICE VISIT (OUTPATIENT)
Dept: NEUROLOGY | Age: 25
End: 2020-10-30
Payer: MEDICAID

## 2020-10-30 ENCOUNTER — OFFICE VISIT (OUTPATIENT)
Dept: NEUROLOGY | Age: 25
End: 2020-10-30

## 2020-10-30 ENCOUNTER — TELEPHONE (OUTPATIENT)
Dept: NEUROLOGY | Age: 25
End: 2020-10-30

## 2020-10-30 DIAGNOSIS — R20.0 NUMBNESS: Primary | ICD-10-CM

## 2020-10-30 DIAGNOSIS — R20.0 BILATERAL NUMBNESS AND TINGLING OF ARMS AND LEGS: Primary | ICD-10-CM

## 2020-10-30 DIAGNOSIS — R20.2 BILATERAL NUMBNESS AND TINGLING OF ARMS AND LEGS: Primary | ICD-10-CM

## 2020-10-30 PROCEDURE — 11104 PUNCH BX SKIN SINGLE LESION: CPT | Performed by: PSYCHIATRY & NEUROLOGY

## 2020-10-30 PROCEDURE — 11105 PUNCH BX SKIN EA SEP/ADDL: CPT | Performed by: PSYCHIATRY & NEUROLOGY

## 2020-10-30 NOTE — PROCEDURES
[facility] Autonomic Laboratory  [facility], [address] [place], [address] [facility]  Phone: [phone]  FAX: [phone]     Clinical Autonomic Testing Report     Patient ID:  Jone Tom  [id number]  9 y.o.  1995     REFERRED BY: Yane Vera MD  PCP: Lindsay Quintanilla DO    Date of Testing: 10/30/2020     Indication/History:    Patient comes in with neck pain and numbness in the upper extremities. (+) headache  (+) heart disease - VSD repair as a child. Patient is coming for syncope/autonomic dysfunction evaluation. Medications taken 48 hrs before the test: None     Procedure: This Autonomic Nervous System (ANS) testing is performed by utilizing [address] [place] [address] [employer] Autonomic System, with established protocol. Multiple procedures performed: Heart rate response to deep breathing (HRDB), Valsalva ratio, Heart rate and BP response to head up tilt (HUT), and Quantitative sudomotor axon reflex testing (QSWEAT) . Result:  1. Heart response to deep  breathing (HRDB): 2 series of 6 cycles were performed and the mean of 6 consecutive cycles was obtained. Average HR difference was 29.1 and E:I ratio was 1.61.    2. Heart rate response to Valsalva maneuver:  fegd-py-elpy BP to Valsalva was measured and BP response in all 4 phases was normal. Heart response was the opposite of BP, a normal response. ( VR = 2.44 )  3. HUT (head-up tilt) : Fchs-fd-qolf BP and HR were measured, up to 15 minutes post tilt. No significant BP reduction. There is an increased heart rate with no reported symptoms noted. 4. SUDOMOTOR: QSWEAT response showed relatively preserved sweat production in all 4 localities (forearm, proximal leg, distal leg, foot) of the right upper and lower limbs, comparing patient to age group. Impression:   NORMAL    Relatively preserved autonomic function for this age.          Stephanie Peck MD  Diplomate, American Board of Psychiatry and Neurology  Diplomate, Neuromuscular Medicine  Diplomate, American Board of Electrodiagnostic Medicine    Note: Raw Data will be scanned separately in Media

## 2020-10-30 NOTE — PROCEDURES
[facility] NEUROLOGY CLINIC [place]    OFFICE PROCEDURE NOTE    PROCEDURE: Skin Biopsy  Date of Procedure: 10/30/2020    CPT Code:  11104 x 1  11105 x 2    ICD-10 Code:     ICD-10-CM ICD-9-CM    1. Bilateral numbness and tingling of arms and legs  R20.0 782.0     R20.2          Time Out performed immediately prior to the start of procedure:  I, Yuly Curtis MD, have performed the following review on Lillian Arellano prior to the start of the procedure: Skin Biopsy. The patient was identified by name and date of birth. Agreement on the procedure to be performed was verified. The potential Benefits and potential Risks were explained to the patient. The procedure site(s) were verified and marked as necessary. Consent was signed and verified. The patient was positioned for comfort. Time: 1045. Date of Procedure: 10/30/2020. Procedure performed by: Yuly Curtis MD.  Provider assisted by: none. Patient assisted by: self. How patient tolerated procedure: mild procedure-related pain, no complications. Comments: none. TECHNICAL:      The procedure and potential complications were explained to the patient, and verbal consent was obtained. The skin was cleansed with the betadine swabs over the bilateral EDB,  and 5 cm proximal to the ulnar side of the right wrist on the dorsal surface. The cleaned areas were infiltrated with 1% lidocaine with epinephrine subcutaneous. A skin punch biopsy was performed at each site with the provided biopsy tool. Each sample was placed into the provided tubes/ containers (each labeled using the provided labels), lids tightened, placed back into the styrofoam villa and then placed on ice in the supplied styrofoam box and shipped back to [address] [place] [address] via overnight delivery/ [employer]. The biopsy sites were cleaned with alcohol swabs, then bandages applied. The patient was given post-biopsy instructions regarding aftercare.  There were no immediate or obvious complications and the patient did not complain of pain afterwards.       Signed By: Meg Stallings MD     October 30, 2020

## 2020-10-30 NOTE — TELEPHONE ENCOUNTER
Called [employer] and scheduled  for skin biopsy kit.      Tracking #[id number]   Confirmation# [id number]

## 2020-10-30 NOTE — LETTER
10/30/2020 3:27 PM 
 
Patient:  Ruslan Han YOB: 1995 Date of Visit: 10/30/2020 [facility], [address] [address] VIA Facsimile: [phone]: Thank you for referring Ms. Toño Moctezuma to me for ANS testing. [facility] Autonomic Laboratory [place] [address], [address] Phone: [phone] FAX: [phone] Clinical Autonomic Testing Report Patient ID: 
Ruslan Han [id number] 
89 y.o. 
1995 REFERRED BY: Chivo Cummins MD 
PCP: Ramonita Richards DO Date of Testing: 10/30/2020 Indication/History:   
Patient comes in with neck pain and numbness in the upper extremities. (+) headache 
(+) heart disease - VSD repair as a child. Patient is coming for syncope/autonomic dysfunction evaluation. Medications taken 48 hrs before the test: None Procedure: This Autonomic Nervous System (ANS) testing is performed by utilizing [address] [place] [address] [employer] Autonomic System, with established protocol. Multiple procedures performed: Heart rate response to deep breathing (HRDB), Valsalva ratio, Heart rate and BP response to head up tilt (HUT), and Quantitative sudomotor axon reflex testing (QSWEAT) . Result: 1. Heart response to deep  breathing (HRDB): 2 series of 6 cycles were performed and the mean of 6 consecutive cycles was obtained. Average HR difference was 29.1 and E:I ratio was 1.61.   
2. Heart rate response to Valsalva maneuver:  zren-ml-xdcg BP to Valsalva was measured and BP response in all 4 phases was normal. Heart response was the opposite of BP, a normal response. ( VR = 2.44 ) 3. HUT (head-up tilt) : Msjd-fi-oxfu BP and HR were measured, up to 15 minutes post tilt. No significant BP reduction. There is an increased heart rate with no reported symptoms noted.  
4. SUDOMOTOR: QSWEAT response showed relatively preserved sweat production in all 4 localities (forearm, proximal leg, distal leg, foot) of the right upper and lower limbs, comparing patient to age group. Impression:  
NORMAL Relatively preserved autonomic function for this age. Raphael Solano MD 
Diplomate, American Board of Psychiatry and Neurology Diplomate, Neuromuscular Medicine Diplomate, [address] [place] [address] Board of Electrodiagnostic Medicine Note: Raw Data will be scanned separately in Media

## 2020-10-30 NOTE — PATIENT INSTRUCTIONS
Procedure: Skin Biopsy Provider: Dr. Yuly Curtis After Care Instructions Apply neosporin and bandaid to each biopsy side DAILY until the sites are fully healed. No taking baths, getting in pools or hot tubs until the sites are fully healed. Take showers instead. Notify the Neurology Clinic if any of the sites look infected(turns red, swollen, and/or has pus coming from it). This is very uncommon, but possible, and if this happens, you should notify the Neurology Clinic/Dr. Miriam Taylor so he can see it and prescribe antibiotics if needed. Follow up with Dr. John Coronado 4-5 weeks from today. Call 3 days before your office visit to be sure we have the biopsy result.

## 2020-11-13 ENCOUNTER — PATIENT MESSAGE (OUTPATIENT)
Dept: NEUROLOGY | Age: 25
End: 2020-11-13

## 2020-11-17 ENCOUNTER — TELEPHONE (OUTPATIENT)
Dept: NEUROLOGY | Age: 25
End: 2020-11-17

## 2020-11-17 DIAGNOSIS — Z92.22 HISTORY OF MONOCLONAL DRUG THERAPY: ICD-10-CM

## 2020-11-17 DIAGNOSIS — Z92.22 HISTORY OF MONOCLONAL DRUG THERAPY: Primary | ICD-10-CM

## 2020-11-17 NOTE — TELEPHONE ENCOUNTER
From: Saturnino Sheets  To: Silverio Beckett MD  Sent: 11/13/2020 1:24 PM EST  Subject: Prescription Question    I need a prior authorization to get my emgality again. The pharmacy should be faxing the info over to you soon.

## 2020-11-20 LAB — HCG UR QL: NEGATIVE

## 2020-11-23 ENCOUNTER — PATIENT MESSAGE (OUTPATIENT)
Dept: NEUROLOGY | Age: 25
End: 2020-11-23

## 2020-12-11 ENCOUNTER — OFFICE VISIT (OUTPATIENT)
Dept: NEUROLOGY | Age: 25
End: 2020-12-11
Payer: MEDICAID

## 2020-12-11 VITALS — DIASTOLIC BLOOD PRESSURE: 62 MMHG | SYSTOLIC BLOOD PRESSURE: 118 MMHG | TEMPERATURE: 97.9 F

## 2020-12-11 DIAGNOSIS — G43.411 INTRACTABLE HEMIPLEGIC MIGRAINE WITH STATUS MIGRAINOSUS: ICD-10-CM

## 2020-12-11 PROCEDURE — 99213 OFFICE O/P EST LOW 20 MIN: CPT | Performed by: NURSE PRACTITIONER

## 2020-12-11 RX ORDER — GALCANEZUMAB 120 MG/ML
120 INJECTION, SOLUTION SUBCUTANEOUS
Qty: 1 ML | Refills: 3 | Status: SHIPPED | OUTPATIENT
Start: 2020-12-11 | End: 2021-03-18 | Stop reason: SDUPTHER

## 2020-12-11 RX ORDER — RIMEGEPANT SULFATE 75 MG/75MG
TABLET, ORALLY DISINTEGRATING ORAL
Qty: 8 TAB | Refills: 3 | Status: SHIPPED | OUTPATIENT
Start: 2020-12-11 | End: 2021-04-27 | Stop reason: SDUPTHER

## 2020-12-11 NOTE — PROGRESS NOTES
Monse Kline is a 31 y.o. female who presents with the following  Chief Complaint   Patient presents with    Follow-up    Results       HPI      FU for ANS, skin biopsy. Things continue to cause her problems. Wants to see another podiatrist to look at her foot. Has this set up at [address]. Migraines are doing better. Emgality is working well.   nurtec is working well. No Known Allergies    Current Outpatient Medications   Medication Sig    galcanezumab-gnlm (Emgality Pen) 120 mg/mL injection 1 mL by SubCUTAneous route every thirty (30) days.  rimegepant (Nurtec ODT) 75 mg disintegrating tablet 1 po at migraine onset. Max 1 tab/24 hours    acetaminophen (TYLENOL) 325 mg tablet Take  by mouth every four (4) hours as needed for Pain.  calcium carbonate (TUMS) 200 mg calcium (500 mg) chew Take 1 Tab by mouth daily. No current facility-administered medications for this visit.         Social History     Tobacco Use   Smoking Status Former Smoker    Packs/day: 1.00    Last attempt to quit: 2020    Years since quittin.5   Smokeless Tobacco Current User       Past Medical History:   Diagnosis Date    Abnormal Pap smear         Abnormal Papanicolaou smear of cervix     follow up normal    Anxiety     Fatigue     Heart abnormality     Memory loss     Neurological disorder     migraines    Other ill-defined conditions(799.89)     Bladder reflux    Pap smear for cervical cancer screening 7/10/17 neg    Ringing in ears     Thyroid activity decreased     Thyroid disease     hypothyroidism in first pregnancy    VSD (ventricular septal defect)        Past Surgical History:   Procedure Laterality Date    CARDIAC SURG PROCEDURE UNLIST      VSD repair    HX OTHER SURGICAL         Family History   Problem Relation Age of Onset    Cancer Maternal Grandmother         lung cancer    Diabetes Maternal Grandmother     Hypertension Maternal Grandmother     Breast Cancer Other        Social History     Socioeconomic History    Marital status: SINGLE     Spouse name: Not on file    Number of children: Not on file    Years of education: Not on file    Highest education level: Not on file   Tobacco Use    Smoking status: Former Smoker     Packs/day: 1.00     Last attempt to quit: 2020     Years since quittin.5    Smokeless tobacco: Current User   Substance and Sexual Activity    Alcohol use: No    Drug use: No    Sexual activity: Yes     Partners: Male   Social History Narrative    ** Merged History Encounter **            Review of Systems   Eyes: Positive for photophobia. Negative for blurred vision and double vision. Gastrointestinal: Negative for nausea and vomiting. Neurological: Positive for tingling, sensory change and headaches. Negative for dizziness, tremors, seizures and loss of consciousness. Remainder of comprehensive review is negative. Physical Exam :    Visit Vitals  /62   Temp 97.9 °F (36.6 °C)             Results for orders placed or performed in visit on 20   HCG URINE, QL   Result Value Ref Range    Pregnancy test, urine Negative Negative       Orders Placed This Encounter    galcanezumab-gnlm (Emgality Pen) 120 mg/mL injection     Si mL by SubCUTAneous route every thirty (30) days. Dispense:  1 mL     Refill:  3    rimegepant (Nurtec ODT) 75 mg disintegrating tablet     Si po at migraine onset. Max 1 tab/24 hours     Dispense:  8 Tab     Refill:  3       1. Intractable hemiplegic migraine with status migrainosus        discussed ANS and skin biopsy. Both normal.   Discussed getting opinion of podiatry. Keep with Emgality and Nurtec for now for migraines.              This note will not be viewable in [employer]

## 2021-03-18 ENCOUNTER — VIRTUAL VISIT (OUTPATIENT)
Dept: NEUROLOGY | Age: 26
End: 2021-03-18
Payer: MEDICAID

## 2021-03-18 DIAGNOSIS — G43.411 INTRACTABLE HEMIPLEGIC MIGRAINE WITH STATUS MIGRAINOSUS: ICD-10-CM

## 2021-03-18 DIAGNOSIS — R20.0 NUMBNESS IN BOTH LEGS: ICD-10-CM

## 2021-03-18 DIAGNOSIS — M54.41 CHRONIC MIDLINE LOW BACK PAIN WITH BILATERAL SCIATICA: Primary | ICD-10-CM

## 2021-03-18 DIAGNOSIS — G89.29 CHRONIC MIDLINE LOW BACK PAIN WITH BILATERAL SCIATICA: Primary | ICD-10-CM

## 2021-03-18 DIAGNOSIS — M79.605 BILATERAL LEG PAIN: ICD-10-CM

## 2021-03-18 DIAGNOSIS — M54.42 CHRONIC MIDLINE LOW BACK PAIN WITH BILATERAL SCIATICA: Primary | ICD-10-CM

## 2021-03-18 DIAGNOSIS — M79.604 BILATERAL LEG PAIN: ICD-10-CM

## 2021-03-18 DIAGNOSIS — M47.24 THORACIC SPONDYLOSIS WITH RADICULOPATHY: ICD-10-CM

## 2021-03-18 PROCEDURE — 99215 OFFICE O/P EST HI 40 MIN: CPT | Performed by: NURSE PRACTITIONER

## 2021-03-18 RX ORDER — GALCANEZUMAB 120 MG/ML
120 INJECTION, SOLUTION SUBCUTANEOUS
Qty: 1 ML | Refills: 3 | Status: SHIPPED | OUTPATIENT
Start: 2021-03-18 | End: 2021-04-26 | Stop reason: SDUPTHER

## 2021-03-18 NOTE — PROGRESS NOTES
Armaan Fan is a 31 y.o. female who was seen by synchronous (real-time) audio-video technology on 3/18/2021 for Follow-up and Migraine      FU for migraines virtually and new concerns of leg symptoms    Migraines are doing fairly well on Emgality with a few a month. Notices they increase towards end of 3 months period due to PA and insurance needing one every 3 months   She will lapse on injection because of the lag time  She does have about 5 a month with a significant improvement from 20 before. She has no new symptoms with these. She is taking Nurtec as she needs and this works well. No issues with this. Did see podiatry for her feet issues. Thought it may also be coming from lower back. Has not had any imaging. She does also feel numbness and tingling in the entire legs at times. Weakness and pain also. She has not had an EMG of the legs. She states symptoms are still very bothersome for her. She has not noticed any positive changes  Being up working at [employer] can make things worse. Assessment & Plan:   Diagnoses and all orders for this visit:    1. Chronic midline low back pain with bilateral sciatica  -     EMG LIMITED; Future  -     MRI LUMB SPINE W WO CONT; Future    2. Intractable hemiplegic migraine with status migrainosus  -     galcanezumab-gnlm (Emgality Pen) 120 mg/mL injection; 1 mL by SubCUTAneous route every thirty (30) days. 3. Bilateral leg pain  -     EMG LIMITED; Future  -     MRI LUMB SPINE W WO CONT; Future  -     MRI [facility] SPINE WO CONT; Future    4. Numbness in both legs  -     EMG LIMITED; Future  -     MRI LUMB SPINE W WO CONT; Future  -     MRI [facility] SPINE WO CONT; Future    5. Thoracic spondylosis with radiculopathy  -     MRI [facility] SPINE WO CONT; Future        MRI thoracic and lumbar spine to look at causes of symptoms worsening in legs, feet, waist as radiculopathy vs other.    Did see podiatry and he feels like it is coming from back.   Will also get an EMG both legs to look at nerve involvement and radiculopathy possibility. Keep [facility]. Can get samples when she comes for EMG as her insurance needs a approval ever 3 months and she will get off schedule due to this and pharmacy  She can keep this and supplement when needed. Nurtec is working well for PRN Rescue. Keep this also. FU after. Subjective:       Prior to Admission medications    Medication Sig Start Date End Date Taking? Authorizing Provider   galcanezumab-gnlm (Emgality Pen) 120 mg/mL injection 1 mL by SubCUTAneous route every thirty (30) days. 3/18/21  Yes Leda Donald NP   rimegepant (Nurtec ODT) 75 mg disintegrating tablet 1 po at migraine onset. Max 1 tab/24 hours 12/11/20  Yes Leda Donald NP   acetaminophen (TYLENOL) 325 mg tablet Take  by mouth every four (4) hours as needed for Pain. Yes Provider, Historical   calcium carbonate (TUMS) 200 mg calcium (500 mg) chew Take 1 Tab by mouth daily. Yes Provider, Historical   galcanezumab-gnlm (Emgality Pen) 120 mg/mL injection 1 mL by SubCUTAneous route every thirty (30) days. 12/11/20 3/18/21  Leda Donald NP     Patient Active Problem List   Diagnosis Code    Pregnancy Z34.90    Normal labor O80, Z37.9    Neuropathy G62.9     Patient Active Problem List    Diagnosis Date Noted    Neuropathy 10/22/2020    Normal labor 02/21/2018    Pregnancy 08/06/2014     Current Outpatient Medications   Medication Sig Dispense Refill    galcanezumab-gnlm (Emgality Pen) 120 mg/mL injection 1 mL by SubCUTAneous route every thirty (30) days. 1 mL 3    rimegepant (Nurtec ODT) 75 mg disintegrating tablet 1 po at migraine onset. Max 1 tab/24 hours 8 Tab 3    acetaminophen (TYLENOL) 325 mg tablet Take  by mouth every four (4) hours as needed for Pain.  calcium carbonate (TUMS) 200 mg calcium (500 mg) chew Take 1 Tab by mouth daily.        No Known Allergies  Past Medical History:   Diagnosis Date  Abnormal Pap smear         Abnormal Papanicolaou smear of cervix     follow up normal    Anxiety     Fatigue     Heart abnormality     Memory loss     Neurological disorder     migraines    Other ill-defined conditions(799.89)     Bladder reflux    Pap smear for cervical cancer screening 7/10/17 neg    Ringing in ears     Thyroid activity decreased     Thyroid disease     hypothyroidism in first pregnancy    VSD (ventricular septal defect)      Past Surgical History:   Procedure Laterality Date    HX OTHER SURGICAL      PA CARDIAC SURG PROCEDURE UNLIST      VSD repair     Family History   Problem Relation Age of Onset    Cancer Maternal Grandmother         lung cancer    Diabetes Maternal Grandmother     Hypertension Maternal Grandmother     Breast Cancer Other      Social History     Tobacco Use    Smoking status: Former Smoker     Packs/day: 1.00     Quit date: 2020     Years since quittin.8    Smokeless tobacco: Current User   Substance Use Topics    Alcohol use: No       Review of Systems   Eyes: Positive for blurred vision and photophobia. Negative for double vision. Respiratory: Negative for shortness of breath and wheezing. Cardiovascular: Negative for chest pain and palpitations. Gastrointestinal: Negative for nausea and vomiting. Musculoskeletal: Positive for back pain and neck pain. Neurological: Positive for tingling, sensory change, weakness and headaches. Negative for tremors, speech change and focal weakness. Psychiatric/Behavioral: Negative for memory loss. The patient does not have insomnia. Objective:   No flowsheet data found.      [INSTRUCTIONS:  \"[x]\" Indicates a positive item  \"[]\" Indicates a negative item  -- DELETE ALL ITEMS NOT EXAMINED]    Constitutional: [x] Appears well-developed and well-nourished [x] No apparent distress      [] Abnormal -     Mental status: [x] Alert and awake  [x] Oriented to person/place/time [x] Able to follow commands    [] Abnormal -     Eyes:   EOM    [x]  Normal    [] Abnormal -   Sclera  [x]  Normal    [] Abnormal -          Discharge [x]  None visible   [] Abnormal -     HENT: [x] Normocephalic, atraumatic  [] Abnormal -   [x] Mouth/Throat: Mucous membranes are moist    External Ears [x] Normal  [] Abnormal -    Neck: [x] No visualized mass [] Abnormal -     Pulmonary/Chest: [x] Respiratory effort normal   [x] No visualized signs of difficulty breathing or respiratory distress        [] Abnormal -      Musculoskeletal:   [x] Normal gait with no signs of ataxia         [x] Normal range of motion of neck        [] Abnormal -     Neurological:        [x] No Facial Asymmetry (Cranial nerve 7 motor function) (limited exam due to video visit)          [x] No gaze palsy        [] Abnormal -          Skin:        [x] No significant exanthematous lesions or discoloration noted on facial skin         [] Abnormal -            Psychiatric:       [x] Normal Affect [] Abnormal -        [x] No Hallucinations    Other pertinent observable physical exam findings:-        We discussed the expected course, resolution and complications of the diagnosis(es) in detail. Medication risks, benefits, costs, interactions, and alternatives were discussed as indicated. I advised her to contact the office if her condition worsens, changes or fails to improve as anticipated. She expressed understanding with the diagnosis(es) and plan. Jewell Gerber, was evaluated through a synchronous (real-time) audio-video encounter. The patient (or guardian if applicable) is aware that this is a billable service. Verbal consent to proceed has been obtained within the past 12 months. The visit was conducted pursuant to the emergency declaration under the [address] [place] [address] and the [place] and [place] [employer] Act.   Patient identification was verified, and a caregiver was present when appropriate. The patient was located in a state where the provider was credentialed to provide care.       Jason Sheffield, DEE

## 2021-04-01 ENCOUNTER — OFFICE VISIT (OUTPATIENT)
Dept: NEUROLOGY | Age: 26
End: 2021-04-01

## 2021-04-01 VITALS — TEMPERATURE: 97.5 F

## 2021-04-01 DIAGNOSIS — R20.0 NUMBNESS IN BOTH LEGS: Primary | ICD-10-CM

## 2021-04-01 NOTE — PROCEDURES
EMG/ NCS Report  [facility] - INPATIENT  [address] [facility], [address] [place], [address]   Ph: [phone]   FAX: [phone]/ [phone]  Test Date:  2019      Test Date:  2021    Patient: Dung Pierre : 1995 Physician: Jennyfer Alonso MD   Sex: Female Height: ' \" Ref Stephanie Arnett   ID#: [id number] Weight:  lbs. Technician: Santiago Trammell     Patient History / Exam:  Patient comes with intermittent numbness of both lower extremities (+) right lower back, hip and buttock pain. Patient is coming for neuropathy evaluation. EMG & NCV Findings:  Evaluation of the left Fibular motor and the right Fibular motor nerves showed normal distal onset latency (L3.9, R3.5 ms), normal amplitude (L4.1, R5.1 mV), normal conduction velocity (B Fib-Ankle, L48, R47 m/s), and normal conduction velocity (Poplt-B Fib, L56, R56 m/s). The left tibial motor and the right tibial motor nerves showed normal distal onset latency (L3.8, R3.4 ms), normal amplitude (L14.2, R14.6 mV), and normal conduction velocity (Knee-Ankle, L44, R49 m/s). The left Sup Fibular sensory, the right Sup Fibular sensory, the left sural sensory, and the right sural sensory nerves showed normal distal peak latency (L2.4, R2.6, L3.4, R3.4 ms) and normal amplitude (L23.5, R9.3, L16.7, R24.9 µV). All F Wave latencies were within normal limits. All F Wave left vs. right side latency differences were within normal limits. All H Reflex left vs. right side latency differences were within normal limits. All examined muscles (as indicated in the following table) showed no evidence of electrical instability. Impression:    Extensive electrodiagnostic examination of the right and left lower extremities is normal.    Specifically, there is no evidence of a peripheral neuropathy or lumbosacral motor radiculopathy.         Jennyfer Alonso MD  Diplomate, American Board of Psychiatry and Neurology  Diplomate, Neuromuscular Medicine  Diplomate, American Board of Electrodiagnostic Medicine  Director, [address] [place] [address] Accredited Laboratory with Exemplary Status          Nerve Conduction Studies  Anti Sensory Summary Table     Stim Site NR Peak (ms) Norm Peak (ms) P-T Amp (µV) Norm P-T Amp Site1 Site2 Dist (cm)   Left Sup Fibular Anti Sensory (Lat ankle)  29.5°C   Lower leg    2.4 <4.4 23.5 >6 Lower leg Lat ankle 10.0   Right Sup Fibular Anti Sensory (Lat ankle)  29.6°C   Lower leg    2.6 <4.4 9.3 >6 Lower leg Lat ankle 10.0   Left Sural Anti Sensory (Lat Mall)  29.6°C   Calf    3.4 <4.5 16.7 >4.0 Calf Lat Mall 14.0   Right Sural Anti Sensory (Lat Mall)  29.7°C   Calf    3.4 <4.5 24.9 >4.0 Calf Lat Mall 14.0     Motor Summary Table     Stim Site NR Onset (ms) Norm Onset (ms) O-P Amp (mV) Norm O-P Amp Amp (Prev) (%) Site1 Site2 Dist (cm) Denilson (m/s) Norm Denilson (m/s)   Left Fibular Motor (Ext Dig Brev)  29.6°C   Ankle    3.9 <6.5 4.1 >2.6 100.0 Ankle Ext Dig Brev 8.0     B Fib    9.9  4.1  100.0 B Fib Ankle 29.0 48 >38   Poplt    11.7  4.7  114.6 Poplt B Fib 10.0 56 >38   Right Fibular Motor (Ext Dig Brev)  29.7°C   Ankle    3.5 <6.5 5.1 >2.6 100.0 Ankle Ext Dig Brev 8.0     B Fib    9.7  5.0  98.0 B Fib Ankle 29.0 47 >38   Poplt    11.5  4.9  98.0 Poplt B Fib 10.0 56 >38   Left Tibial Motor (Abd Mar Brev)  29.6°C   Ankle    3.8 <6.1 14.2 >5.8 100.0 Ankle Abd Mar Brev 8.0     Knee    11.0  14.2  100.0 Knee Ankle 32.0 44 >44   Right Tibial Motor (Abd Mar Brev)  29.5°C   Ankle    3.4 <6.1 14.6 >5.8 100.0 Ankle Abd Mar Brev 8.0     Knee    10.4  9.7  66.4 Knee Ankle 34.0 49 >44     F Wave Studies     NR F-Lat (ms) Lat Norm (ms) L-R F-Lat (ms) L-R Lat Norm   Left Tibial (Mrkrs) (Abd Hallucis)  29.6°C      42.81 <56 0.35 <5.7   Right Tibial (Mrkrs) (Abd Hallucis)  29.5°C      42.46 <56 0.35 <5.7     H Reflex Studies     NR H-Lat (ms) L-R H-Lat (ms) L-R Lat Norm   Left Tibial (Gastroc)  29.6°C      26.06 1.88 <2.0   Right Tibial (Gastroc)  29.5°C      27.94 1.88 <2.0     EMG     Side Muscle Nerve Root Ins Act Fibs Psw Recrt Duration Amp Poly Comment   Left Ext Dig Brev Dp Br Peron L5, S1 Nml Nml Nml Nml Nml Nml Nml    Left AbdHallucis MedPlantar S1-2 Nml Nml Nml Nml Nml Nml Nml    Left AntTibialis Dp Br Peron L4-5 Nml Nml Nml Nml Nml Nml Nml    Left MedGastroc Tibial S1-2 Nml Nml Nml Nml Nml Nml Nml    Left VastusLat Femoral L2-4 Nml Nml Nml Nml Nml Nml Nml    Right Ext Dig Brev Dp Br Peron L5, S1 Nml Nml Nml Nml Nml Nml Nml    Right AbdHallucis MedPlantar S1-2 Nml Nml Nml Nml Nml Nml Nml    Right AntTibialis Dp Br Peron L4-5 Nml Nml Nml Nml Nml Nml Nml    Right MedGastroc Tibial S1-2 Nml Nml Nml Nml Nml Nml Nml    Right VastusLat Femoral L2-4 Nml Nml Nml Nml Nml Nml Nml    Right GluteusMed SupGluteal L4-S1 Nml Nml Nml Nml Nml Nml Nml    Right Lower Lumb Parasp Rami L5,S1 Nml Nml Nml Nml Nml Nml Nml                Nerve Conduction Studies  Anti Sensory Left/Right Comparison     Stim Site L Lat (ms) R Lat (ms) L-R Lat (ms) L Amp (µV) R Amp (µV) L-R Amp (%) Site1 Site2 L Denilson (m/s) R Denilson (m/s) L-R Denilson (m/s)   Sup Fibular Anti Sensory (Lat ankle)  29.5°C   Lower leg 1.8 1.9 0.1 23.5 9.3 60.4 Lower leg Lat ankle 56 53 3   Sural Anti Sensory (Lat Mall)  29.6°C   Calf 2.9 2.8 0.1 16.7 24.9 32.9 Calf Lat Mall 48 50 2     Motor Left/Right Comparison     Stim Site L Lat (ms) R Lat (ms) L-R Lat (ms) L Amp (mV) R Amp (mV) L-R Amp (%) Site1 Site2 L Denilson (m/s) R Denilson (m/s) L-R Denilson (m/s)   Fibular Motor (Ext Dig Brev)  29.6°C   Ankle 3.9 3.5 0.4 4.1 5.1 19.6 Ankle Ext Dig Brev      B Fib 9.9 9.7 0.2 4.1 5.0 18.0 B Fib Ankle 48 47 1   Poplt 11.7 11.5 0.2 4.7 4.9 4.1 Poplt B Fib 56 56 0   Tibial Motor (Abd Mar Brev)  29.6°C   Ankle 3.8 3.4 0.4 14.2 14.6 2.7 Ankle Abd Mar Brev      Knee 11.0 10.4 0.6 14.2 9.7 31.7 Knee Ankle 44 49 5         Waveforms:

## 2021-04-01 NOTE — LETTER
2021 11:29 AM 
 
Patient:  Brigette Damon YOB: 1995 Date of Visit: 2021 Dear Ava Kearns MD 
[facility] [address] [address] Via Fax: [phone]: Thank you for referring Ms. Robert Campbell to me for EMG/NCS. EMG/ NCS Report [address] [address], [address] [place] [address] Ph: [phone] FAX: [phone] Test Date:  2019 Test Date:  2021 Patient: Cori Whitmore : 1995 Physician: Hai Kinsey MD  
Sex: Female Height: ' \" Ref Ariela Garcia  
ID#: [id number] Weight:  lbs. Technician: Valerio Massey Patient History / Exam: 
CC:nathanael. foot pain with lower back pain. EMG & NCV Findings: 
Evaluation of the left Fibular motor and the right Fibular motor nerves showed normal distal onset latency (L3.9, R3.5 ms), normal amplitude (L4.1, R5.1 mV), normal conduction velocity (B Fib-Ankle, L48, R47 m/s), and normal conduction velocity (Poplt-B Fib, L56, R56 m/s). The left tibial motor and the right tibial motor nerves showed normal distal onset latency (L3.8, R3.4 ms), normal amplitude (L14.2, R14.6 mV), and normal conduction velocity (Knee-Ankle, L44, R49 m/s). The left Sup Fibular sensory, the right Sup Fibular sensory, the left sural sensory, and the right sural sensory nerves showed normal distal peak latency (L2.4, R2.6, L3.4, R3.4 ms) and normal amplitude (L23.5, R9.3, L16.7, R24.9 µV). All F Wave latencies were within normal limits. All F Wave left vs. right side latency differences were within normal limits. All H Reflex left vs. right side latency differences were within normal limits. All examined muscles (as indicated in the following table) showed no evidence of electrical instability. Impression: 
 
 
 
___________________________ Hai Kinsey MD 
 
 
Nerve Conduction Studies Anti Sensory Summary Table Stim Site NR Peak (ms) Norm Peak (ms) P-T Amp (µV) Norm P-T Amp Site1 Site2 Dist (cm) Left Sup Fibular Anti Sensory (Lat ankle)  29.5°C Lower leg    2.4 <4.4 23.5 >6 Lower leg Lat ankle 10.0 Right Sup Fibular Anti Sensory (Lat ankle)  29.6°C Lower leg    2.6 <4.4 9.3 >6 Lower leg Lat ankle 10.0 Left Sural Anti Sensory (Lat Mall)  29.6°C Calf    3.4 <4.5 16.7 >4.0 Calf Lat Mall 14.0 Right Sural Anti Sensory (Lat Mall)  29.7°C Calf    3.4 <4.5 24.9 >4.0 Calf Lat Mall 14.0 Motor Summary Table Stim Site NR Onset (ms) Norm Onset (ms) O-P Amp (mV) Norm O-P Amp Amp (Prev) (%) Site1 Site2 Dist (cm) Denilson (m/s) Norm Denilson (m/s) Left Fibular Motor (Ext Dig Brev)  29.6°C Ankle    3.9 <6.5 4.1 >2.6 100.0 Ankle Ext Dig Brev 8.0 B Fib    9.9  4.1  100.0 B Fib Ankle 29.0 48 >38 Poplt    11.7  4.7  114.6 Poplt B Fib 10.0 56 >38 Right Fibular Motor (Ext Dig Brev)  29.7°C Ankle    3.5 <6.5 5.1 >2.6 100.0 Ankle Ext Dig Brev 8.0 B Fib    9.7  5.0  98.0 B Fib Ankle 29.0 47 >38 Poplt    11.5  4.9  98.0 Poplt B Fib 10.0 56 >38 Left Tibial Motor (Abd Mar Brev)  29.6°C Ankle    3.8 <6.1 14.2 >5.8 100.0 Ankle Abd Mar Brev 8.0 Knee    11.0  14.2  100.0 Knee Ankle 32.0 44 >44 Right Tibial Motor (Abd Mar Brev)  29.5°C Ankle    3.4 <6.1 14.6 >5.8 100.0 Ankle Abd Mar Brev 8.0 Knee    10.4  9.7  66.4 Knee Ankle 34.0 49 >44 F Wave Studies NR F-Lat (ms) Lat Norm (ms) L-R F-Lat (ms) L-R Lat Norm Left Tibial (Mrkrs) (Abd Hallucis)  29.6°C  
   42.81 <56 0.35 <5.7 Right Tibial (Mrkrs) (Abd Hallucis)  29.5°C  
   42.46 <56 0.35 <5.7 H Reflex Studies NR H-Lat (ms) L-R H-Lat (ms) L-R Lat Norm Left Tibial (Gastroc)  29.6°C  
   26.06 1.88 <2.0 Right Tibial (Gastroc)  29.5°C  
   27.94 1.88 <2.0 EMG Side Muscle Nerve Root Ins Act Fibs Psw Recrt Duration Amp Poly Comment Left Ext Dig Brev Dp Br Peron L5, S1 Nml Nml Nml Nml Nml Nml Nml Left AbdHallucis MedPlantar S1-2 Nml Nml Nml Nml Nml Nml Nml Left AntTibialis Dp Br Peron L4-5 Nml Nml Nml Nml Nml Nml Nml Left MedGastroc Tibial S1-2 Nml Nml Nml Nml Nml Nml Nml Left VastusLat Femoral L2-4 Nml Nml Nml Nml Nml Nml Nml Right Ext Dig Brev Dp Br Peron L5, S1 Nml Nml Nml Nml Nml Nml Nml Right AbdHallucis MedPlantar S1-2 Nml Nml Nml Nml Nml Nml Nml Right AntTibialis Dp Br Peron L4-5 Nml Nml Nml Nml Nml Nml Nml Right MedGastroc Tibial S1-2 Nml Nml Nml Nml Nml Nml Nml Right VastusLat Femoral L2-4 Nml Nml Nml Nml Nml Nml Nml Right GluteusMed SupGluteal L4-S1 Nml Nml Nml Nml Nml Nml Nml Right Lower Lumb Parasp Rami L5,S1 Nml Nml Nml Nml Nml Nml Nml Nerve Conduction Studies Anti Sensory Left/Right Comparison Stim Site L Lat (ms) R Lat (ms) L-R Lat (ms) L Amp (µV) R Amp (µV) L-R Amp (%) Site1 Site2 L Denilson (m/s) R Denilson (m/s) L-R Denilson (m/s) Sup Fibular Anti Sensory (Lat ankle)  29.5°C Lower leg 1.8 1.9 0.1 23.5 9.3 60.4 Lower leg Lat ankle 56 53 3 Sural Anti Sensory (Lat Mall)  29.6°C Calf 2.9 2.8 0.1 16.7 24.9 32.9 Calf Lat Mall 48 50 2 Motor Left/Right Comparison Stim Site L Lat (ms) R Lat (ms) L-R Lat (ms) L Amp (mV) R Amp (mV) L-R Amp (%) Site1 Site2 L Denilson (m/s) R Denilson (m/s) L-R Denilson (m/s) Fibular Motor (Ext Dig Brev)  29.6°C Ankle 3.9 3.5 0.4 4.1 5.1 19.6 Ankle Ext Dig Brev     
B Fib 9.9 9.7 0.2 4.1 5.0 18.0 B Fib Ankle 48 47 1 Poplt 11.7 11.5 0.2 4.7 4.9 4.1 Poplt B Fib 56 56 0 Tibial Motor (Abd Mar Brev)  29.6°C Ankle 3.8 3.4 0.4 14.2 14.6 2.7 Ankle Abd Mar Brev     
Knee 11.0 10.4 0.6 14.2 9.7 31.7 Knee Ankle 44 49 5 Waveforms:

## 2021-04-22 ENCOUNTER — PATIENT MESSAGE (OUTPATIENT)
Dept: NEUROLOGY | Age: 26
End: 2021-04-22

## 2021-04-22 DIAGNOSIS — G43.411 INTRACTABLE HEMIPLEGIC MIGRAINE WITH STATUS MIGRAINOSUS: ICD-10-CM

## 2021-04-26 RX ORDER — GALCANEZUMAB 120 MG/ML
120 INJECTION, SOLUTION SUBCUTANEOUS
Qty: 1 ML | Refills: 3 | Status: SHIPPED | OUTPATIENT
Start: 2021-04-26 | End: 2021-04-27 | Stop reason: SDUPTHER

## 2021-04-27 DIAGNOSIS — G43.411 INTRACTABLE HEMIPLEGIC MIGRAINE WITH STATUS MIGRAINOSUS: ICD-10-CM

## 2021-04-27 RX ORDER — GALCANEZUMAB 120 MG/ML
120 INJECTION, SOLUTION SUBCUTANEOUS
Qty: 3 ML | Refills: 3 | Status: SHIPPED | OUTPATIENT
Start: 2021-04-27 | End: 2021-05-03 | Stop reason: SDUPTHER

## 2021-04-27 RX ORDER — RIMEGEPANT SULFATE 75 MG/75MG
TABLET, ORALLY DISINTEGRATING ORAL
Qty: 24 TAB | Refills: 3 | Status: SHIPPED | OUTPATIENT
Start: 2021-04-27 | End: 2022-05-06 | Stop reason: ALTCHOICE

## 2021-05-02 ENCOUNTER — PATIENT MESSAGE (OUTPATIENT)
Dept: NEUROLOGY | Age: 26
End: 2021-05-02

## 2021-05-03 DIAGNOSIS — G43.411 INTRACTABLE HEMIPLEGIC MIGRAINE WITH STATUS MIGRAINOSUS: ICD-10-CM

## 2021-05-03 RX ORDER — GALCANEZUMAB 120 MG/ML
120 INJECTION, SOLUTION SUBCUTANEOUS
Qty: 3 ML | Refills: 3 | Status: SHIPPED | OUTPATIENT
Start: 2021-05-03 | End: 2021-05-05 | Stop reason: SDUPTHER

## 2021-05-05 DIAGNOSIS — G43.411 INTRACTABLE HEMIPLEGIC MIGRAINE WITH STATUS MIGRAINOSUS: ICD-10-CM

## 2021-05-05 DIAGNOSIS — M79.2 NERVE PAIN: Primary | ICD-10-CM

## 2021-05-05 DIAGNOSIS — M79.2 NERVE PAIN: ICD-10-CM

## 2021-05-05 RX ORDER — GALCANEZUMAB 120 MG/ML
120 INJECTION, SOLUTION SUBCUTANEOUS
Qty: 3 ML | Refills: 3 | Status: SHIPPED | OUTPATIENT
Start: 2021-05-05 | End: 2021-05-07 | Stop reason: SDUPTHER

## 2021-05-05 RX ORDER — GABAPENTIN 300 MG/1
CAPSULE ORAL
Qty: 30 CAP | Refills: 5 | Status: SHIPPED | OUTPATIENT
Start: 2021-05-05 | End: 2021-05-05 | Stop reason: SDUPTHER

## 2021-05-05 RX ORDER — GABAPENTIN 300 MG/1
CAPSULE ORAL
Qty: 90 CAP | Refills: 1 | Status: SHIPPED | OUTPATIENT
Start: 2021-05-05

## 2021-05-07 DIAGNOSIS — G43.411 INTRACTABLE HEMIPLEGIC MIGRAINE WITH STATUS MIGRAINOSUS: ICD-10-CM

## 2021-05-07 RX ORDER — GALCANEZUMAB 120 MG/ML
120 INJECTION, SOLUTION SUBCUTANEOUS
Qty: 3 ML | Refills: 3 | Status: SHIPPED | OUTPATIENT
Start: 2021-05-07 | End: 2022-05-06

## 2021-06-02 NOTE — TELEPHONE ENCOUNTER
PA initiated through [address] [place] [address] key # -   [id number] PA Case ID: [id number]    CaseId:[id number];Status:Approved; Review Type:Prior Auth; Coverage Start Date:05/03/2021; Coverage End Date:06/02/2022

## 2022-03-19 PROBLEM — Z37.9 NORMAL LABOR: Status: ACTIVE | Noted: 2018-02-21

## 2022-03-20 PROBLEM — G62.9 NEUROPATHY: Status: ACTIVE | Noted: 2020-10-22

## 2022-05-06 DIAGNOSIS — G43.411 INTRACTABLE HEMIPLEGIC MIGRAINE WITH STATUS MIGRAINOSUS: Primary | ICD-10-CM

## 2022-05-06 DIAGNOSIS — G43.411 INTRACTABLE HEMIPLEGIC MIGRAINE WITH STATUS MIGRAINOSUS: ICD-10-CM

## 2022-05-06 RX ORDER — GALCANEZUMAB 120 MG/ML
INJECTION, SOLUTION SUBCUTANEOUS
Qty: 3 ML | Refills: 3 | Status: SHIPPED | OUTPATIENT
Start: 2022-05-06 | End: 2022-05-06 | Stop reason: ALTCHOICE

## 2022-05-06 RX ORDER — ERENUMAB-AOOE 140 MG/ML
140 INJECTION, SOLUTION SUBCUTANEOUS
Qty: 3 EACH | Refills: 5 | Status: SHIPPED | OUTPATIENT
Start: 2022-05-06 | End: 2022-05-16 | Stop reason: ALTCHOICE

## 2022-05-16 RX ORDER — FREMANEZUMAB-VFRM 225 MG/1.5ML
225 INJECTION SUBCUTANEOUS
Qty: 4.5 ML | Refills: 1 | Status: SHIPPED | OUTPATIENT
Start: 2022-05-16 | End: 2022-05-24 | Stop reason: SDUPTHER

## 2022-05-24 RX ORDER — FREMANEZUMAB-VFRM 225 MG/1.5ML
225 INJECTION SUBCUTANEOUS
Qty: 4.5 ML | Refills: 1 | Status: SHIPPED | OUTPATIENT
Start: 2022-05-24

## 2022-05-24 NOTE — PROGRESS NOTES
Ajovy 225 mg 1 syringe/30 days   CaseId:[id number];Status:Approved; Review Type:Prior Auth; Coverage Start Date:04/24/2022; Coverage End Date:05/24/2023;

## 2022-09-12 NOTE — IP AVS SNAPSHOT
[address] [facility] Vian[address] Weeks 13 
[phone] Patient: Yesi Horne MRN: [id number] :1995 About your hospitalization You were admitted on:  2018 You last received care in the:  [facility] You were discharged on:  2018 Why you were hospitalized Your primary diagnosis was:  Not on File Your diagnoses also included:  Normal Labor Follow-up Information Follow up With Details Comments Contact Info [facility] Call As needed with breastfeeding questions or concerns [address] [facility] [address] [place] [id number] 
[phone] Provider Unknown   Patient not available to ask Your Scheduled Appointments 2018 11:20 AM EST  
OB VISIT with SIM[facility] AT [address] [place] [address] ([address] [facility]) [place] [address] Vianca Weeks 13  
[phone] Discharge Orders None A check nestor indicates which time of day the medication should be taken. My Medications ASK your doctor about these medications Instructions Each Dose to Equal  
 Morning Noon Evening Bedtime  
 butalbital-acetaminophen-caffeine -40 mg per tablet Commonly known as:  Early Evaristo Your last dose was: Your next dose is: Take 1 Tab by mouth every six (6) hours as needed for Headache. Max Daily Amount: 4 Tabs. 1 Tab  
    
   
   
   
  
 calcium carbonate 200 mg calcium (500 mg) Chew Commonly known as:  TUMS Your last dose was: Your next dose is: Take 1 Tab by mouth daily. 1 Tab  
    
   
   
   
  
 nystatin powder Commonly known as:  MYCOSTATIN Your last dose was: Your next dose is:    
   
   
 Apply 1 g to affected area two (2) times a day.   
 1 g  
    
   
   
   
  
 nystatin-triamcinolone topical cream  
Commonly known as:  MYCOLOG II Your last dose was: Your next dose is:    
   
   
 Apply  to affected area two (2) times a day. PNV66-Iron Fumarate-FA-DSS-DHA 26-1.2- mg Cap Your last dose was: Your next dose is: Take  by mouth. TYLENOL 325 mg tablet Generic drug:  acetaminophen Your last dose was: Your next dose is: Take  by mouth every four (4) hours as needed for Pain. ZANTAC 150 mg tablet Generic drug:  raNITIdine Your last dose was: Your next dose is: Take 150 mg by mouth two (2) times a day. 150 mg Discharge Instructions After Your Delivery (the Postpartum Period): Care Instructions Your Care Instructions Congratulations on the birth of your baby. Like pregnancy, the  period can be a time of excitement, rowan, and exhaustion. You may look at your wondrous little baby and feel happy. You may also be overwhelmed by your new sleep hours and new responsibilities. At first, babies often sleep during the days and are awake at night. They do not have a pattern or routine. They may make sudden gasps, jerk themselves awake, or look like they have crossed eyes. These are all normal, and they may even make you smile. In these first weeks after delivery, try to take good care of yourself. It may take 4 to 6 weeks to feel like yourself again, and possibly longer if you had a  birth. You will likely feel very tired for several weeks. Your days will be full of ups and downs, but lots of rowan as well. Follow-up care is a key part of your treatment and safety. Be sure to make and go to all appointments, and call your doctor if you are having problems. It's also a good idea to know your test results and keep a list of the medicines you take. How can you care for yourself at home? Take care of your body after delivery · Use pads instead of tampons for the bloody flow that may last as long as 2 weeks. · Ease cramps with ibuprofen (Advil, Motrin). · Ease soreness of hemorrhoids and the area between your vagina and rectum with ice compresses or witch hazel pads. · Ease constipation by drinking lots of fluid and eating high-fiber foods. Ask your doctor about over-the-counter stool softeners. · Cleanse yourself with a gentle squeeze of warm water from a bottle instead of wiping with toilet paper. · Take a sitz bath in warm water several times a day. · Wear a good nursing bra. Ease sore and swollen breasts with warm, wet washcloths. · If you are not breastfeeding, use ice rather than heat for breast soreness. · Your period may not start for several months if you are breastfeeding. You may bleed more, and longer at first, than you did before you got pregnant. · Wait until you are healed (about 4 to 6 weeks) before you have sexual intercourse. Your doctor will tell you when it is okay to have sex. · Try not to travel with your baby for 5 or 6 weeks. If you take a long car trip, make frequent stops to walk around and stretch. Avoid exhaustion · Rest every day. Try to nap when your baby naps. · Ask another adult to be with you for a few days after delivery. · Plan for  if you have other children. · Stay flexible so you can eat at odd hours and sleep when you need to. Both you and your baby are making new schedules. · Plan small trips to get out of the house. Change can make you feel less tired. · Ask for help with housework, cooking, and shopping. Remind yourself that your job is to care for your baby. Know about help for postpartum depression · \"Baby blues\" are common for the first 1 to 2 weeks after birth. You may cry or feel sad or irritable for no reason. · Rest whenever you can.  Being tired makes it harder to handle your emotions. · Go for walks with your baby. · Talk to your partner, friends, and family about your feelings. · If your symptoms last for more than a few weeks, or if you feel very depressed, ask your doctor for help. · Postpartum depression can be treated. Support groups and counseling can help. Sometimes medicine can also help. Stay healthy · Eat healthy foods so you have more energy, make good breast milk, and lose extra baby pounds. · If you breastfeed, avoid alcohol and drugs. Stay smoke-free. If you quit during pregnancy, congratulations. · Start daily exercise after 4 to 6 weeks, but rest when you feel tired. · Learn exercises to tone your belly. Do Kegel exercises to regain strength in your pelvic muscles. You can do these exercises while you stand or sit. ¨ Squeeze the same muscles you would use to stop your urine. Your belly and thighs should not move. ¨ Hold the squeeze for 3 seconds, and then relax for 3 seconds. ¨ Start with 3 seconds. Then add 1 second each week until you are able to squeeze for 10 seconds. ¨ Repeat the exercise 10 to 15 times for each session. Do three or more sessions each day. · Find a class for new mothers and new babies that has an exercise time. · If you had a  birth, give yourself a bit more time before you exercise, and be careful. When should you call for help? Call 911 anytime you think you may need emergency care. For example, call if: 
? · You passed out (lost consciousness). ?Call your doctor now or seek immediate medical care if: 
? · You have severe vaginal bleeding. This means you are passing blood clots and soaking through a pad each hour for 2 or more hours. ? · You are dizzy or lightheaded, or you feel like you may faint. ? · You have a fever. ? · You have new belly pain, or your pain gets worse. ? Watch closely for changes in your health, and be sure to contact your doctor if: 
? · Your vaginal bleeding seems to be getting heavier. ? · You have new or worse vaginal discharge. ? · You feel sad, anxious, or hopeless for more than a few days. ? · You do not get better as expected. Where can you learn more? Go to http://cortney-williams.info/. Enter A461 in the search box to learn more about \"After Your Delivery (the Postpartum Period): Care Instructions. \" Current as of: March 16, 2017 Content Version: 11.4 © 8090-2550 [employer]. Care instructions adapted under license by [employer] (which disclaims liability or warranty for this information). If you have questions about a medical condition or this instruction, always ask your healthcare professional. [address] any warranty or liability for your use of this information. Vaginal Childbirth: Care Instructions Your Care Instructions Your body will slowly heal in the next few weeks. It is easy to get too tired and overwhelmed during the first weeks after your baby is born. Changes in your hormones can shift your mood without warning. You may find it hard to meet the extra demands on your energy and time. Take it easy on yourself. Follow-up care is a key part of your treatment and safety. Be sure to make and go to all appointments, and call your doctor if you are having problems. It's also a good idea to know your test results and keep a list of the medicines you take. How can you care for yourself at home? · Vaginal bleeding and cramps ¨ After delivery, you will have a bloody discharge from the vagina. This will turn pink within a week and then white or yellow after about 10 days. It may last for 2 to 4 weeks or longer, until the uterus has healed. Use pads instead of tampons until you stop bleeding. ¨ Do not worry if you pass some blood clots, as long as they are smaller than a golf ball.  If you have a tear or stitches in your vaginal area, change the pad at least every 4 hours to prevent soreness and infection. ¨ You may have cramps for the first few days after childbirth. These are normal and occur as the uterus shrinks to normal size. Take an over-the-counter pain medicine, such as acetaminophen (Tylenol), ibuprofen (Advil, Motrin), or naproxen (Aleve), for cramps. Read and follow all instructions on the label. Do not take aspirin, because it can cause more bleeding. ¨ Do not take two or more pain medicines at the same time unless the doctor told you to. Many pain medicines have acetaminophen, which is Tylenol. Too much acetaminophen (Tylenol) can be harmful. · Stitches ¨ If you have stitches, they will dissolve on their own and do not need to be removed. Follow your doctor's instructions for cleaning the stitched area. ¨ Put ice or a cold pack on your painful area for 10 to 20 minutes at a time, several times a day, for the first few days. Put a thin cloth between the ice and your skin. ¨ Sit in a few inches of warm water (sitz bath) 3 times a day and after bowel movements. The warm water helps with pain and itching. If you do not have a tub, a warm shower might help. · Breast fullness ¨ Your breasts may overfill (engorge) in the first few days after delivery. To help milk flow and to relieve pain, warm your breasts in the shower or by using warm, moist towels before nursing. ¨ If you are not nursing, do not put warmth on your breasts or touch your breasts. Wear a tight bra or sports bra and use ice until the fullness goes away. This usually takes 2 to 3 days. ¨ Put ice or a cold pack on your breast after nursing to reduce swelling and pain. Put a thin cloth between the ice and your skin. · Activity ¨ Eat a balanced diet. Do not try to lose weight by cutting calories. Keep taking your prenatal vitamins, or take a multivitamin. ¨ Get as much rest as you can. Try to take naps when your baby sleeps during the day. ¨ Get some exercise every day. But do not do any heavy exercise until your doctor says it is okay. ¨ Wait until you are healed (about 4 to 6 weeks) before you have sexual intercourse. Your doctor will tell you when it is okay to have sex. ¨ Talk to your doctor about birth control. You can get pregnant even before your period returns. Also, you can get pregnant while you are breastfeeding. · Mental health ¨ It is normal to have some sadness, anxiety, sleeplessness, and mood swings after you go home. If you feel upset or hopeless for more than a few days or are having trouble doing the things you need to do, talk to your doctor. · Constipation and hemorrhoids ¨ Drink plenty of fluids, enough so that your urine is light yellow or clear like water. If you have kidney, heart, or liver disease and have to limit fluids, talk with your doctor before you increase the amount of fluids you drink. ¨ Eat plenty of fiber each day. Have a bran muffin or bran cereal for breakfast, and try eating a piece of fruit for a mid-afternoon snack. ¨ For painful, itchy hemorrhoids, put ice or a cold pack on the area several times a day for 10 minutes at a time. Follow this by putting a warm compress on the area for another 10 to 20 minutes or by sitting in a shallow, warm bath. When should you call for help? Call 911 anytime you think you may need emergency care. For example, call if: 
? · You passed out (lost consciousness). ?Call your doctor now or seek immediate medical care if: 
? · You have severe vaginal bleeding. ? · You are dizzy or lightheaded, or you feel like you may faint. ? · You have a fever. ? · You have new or more pain in your belly or pelvis. ? Watch closely for changes in your health, and be sure to contact your doctor if: 
? · Your vaginal bleeding seems to be getting heavier. ? · You have new or worse vaginal discharge. ? · You feel sad, anxious, or hopeless for more than a few days. ? · You do not get better as expected. Where can you learn more? Go to http://cortney-williams.info/. Enter F261 in the search box to learn more about \"Vaginal Childbirth: Care Instructions. \" Current as of: March 16, 2017 Content Version: 11.4 © 8643-5101 [employer]. Care instructions adapted under license by [employer] (which disclaims liability or warranty for this information). If you have questions about a medical condition or this instruction, always ask your healthcare professional. [address] any warranty or liability for your use of this information. Introducing [facility]! Dear Kike Barrientos: Thank you for requesting a [employer] account. Our records indicate that you already have an active [employer] account. You can access your account anytime at [email or web address] Did you know that you can access your hospital and ER discharge instructions at any time in [employer] You can also review all of your test results from your hospital stay or ER visit. Additional Information If you have questions, please visit the Frequently Asked Questions section of the [employer] website at [email or web address] Remember, [employer] is NOT to be used for urgent needs. For medical emergencies, dial 911. Now available from your iPhone and Android! Providers Seen During Your Hospitalization Provider Specialty Primary office phone Aleksandr Pedroza MD Obstetrics & Gynecology [phone] Your Primary Care Physician (PCP) Primary Care Physician Office Phone Office Fax UNKNOWN, PROVIDER ** None ** ** None ** You are allergic to the following No active allergies Recent Documentation Height Weight Breastfeeding? BMI OB Status Smoking Status 1.549 m 70.3 kg Unknown 29.29 kg/m2 Recent pregnancy Current Every Day Smoker Emergency Contacts Name Discharge Info Relation Home Work Mobile Opal Borrero CAREGIVER [3] Mother [14] [phone] Patient Belongings The following personal items are in your possession at time of discharge: 
  Dental Appliances: None  Visual Aid: None      Home Medications: None   Jewelry: Earrings, Ring  Clothing: At bedside    Other Valuables: Cell Phone  Personal Items Sent to Safe: branden Please provide this summary of care documentation to your next provider. Signatures-by signing, you are acknowledging that this After Visit Summary has been reviewed with you and you have received a copy. Patient Signature:  ____________________________________________________________ Date:  ____________________________________________________________  
  
[facility] Provider Signature:  ____________________________________________________________ Date:  ____________________________________________________________ Render Risk Assessment In Note?: no Additional Notes: Patient instructed to take isotretinoin 2 times weekly Detail Level: Simple

## 2022-11-23 RX ORDER — FREMANEZUMAB-VFRM 225 MG/1.5ML
INJECTION SUBCUTANEOUS
Qty: 4.5 ML | Refills: 3 | Status: SHIPPED | OUTPATIENT
Start: 2022-11-23

## 2023-03-13 ENCOUNTER — APPOINTMENT (OUTPATIENT)
Dept: CT IMAGING | Age: 28
End: 2023-03-13
Attending: EMERGENCY MEDICINE
Payer: COMMERCIAL

## 2023-03-13 ENCOUNTER — APPOINTMENT (OUTPATIENT)
Dept: GENERAL RADIOLOGY | Age: 28
End: 2023-03-13
Attending: EMERGENCY MEDICINE
Payer: COMMERCIAL

## 2023-03-13 ENCOUNTER — HOSPITAL ENCOUNTER (EMERGENCY)
Age: 28
Discharge: HOME | End: 2023-03-13
Attending: EMERGENCY MEDICINE
Payer: COMMERCIAL

## 2023-03-13 VITALS
SYSTOLIC BLOOD PRESSURE: 120 MMHG | HEART RATE: 80 BPM | BODY MASS INDEX: 30.73 KG/M2 | RESPIRATION RATE: 17 BRPM | HEIGHT: 62 IN | DIASTOLIC BLOOD PRESSURE: 82 MMHG | TEMPERATURE: 98.4 F | OXYGEN SATURATION: 100 % | WEIGHT: 167 LBS

## 2023-03-13 DIAGNOSIS — G43.909 MIGRAINE WITHOUT STATUS MIGRAINOSUS, NOT INTRACTABLE, UNSPECIFIED MIGRAINE TYPE: Primary | ICD-10-CM

## 2023-03-13 DIAGNOSIS — R55 SYNCOPE AND COLLAPSE: ICD-10-CM

## 2023-03-13 LAB
ALBUMIN SERPL-MCNC: 3.8 G/DL (ref 3.5–5)
ALBUMIN/GLOB SERPL: 1.2 (ref 1.1–2.2)
ALP SERPL-CCNC: 86 U/L (ref 45–117)
ALT SERPL-CCNC: 16 U/L (ref 12–78)
ANION GAP SERPL CALC-SCNC: 6 MMOL/L (ref 5–15)
APPEARANCE UR: CLEAR
AST SERPL W P-5'-P-CCNC: 17 U/L (ref 15–37)
ATRIAL RATE: 70 BPM
BACTERIA URNS QL MICRO: NEGATIVE /HPF
BASOPHILS # BLD: 0 K/UL (ref 0–0.1)
BASOPHILS NFR BLD: 0 % (ref 0–1)
BILIRUB SERPL-MCNC: 0.2 MG/DL (ref 0.2–1)
BILIRUB UR QL: NEGATIVE
BUN SERPL-MCNC: 11 MG/DL (ref 6–20)
BUN/CREAT SERPL: 20 (ref 12–20)
CA-I BLD-MCNC: 9.3 MG/DL (ref 8.5–10.1)
CALCULATED P AXIS, ECG09: 24 DEGREES
CALCULATED R AXIS, ECG10: 63 DEGREES
CALCULATED T AXIS, ECG11: 65 DEGREES
CHLORIDE SERPL-SCNC: 107 MMOL/L (ref 97–108)
CO2 SERPL-SCNC: 23 MMOL/L (ref 21–32)
COLOR UR: ABNORMAL
CREAT SERPL-MCNC: 0.56 MG/DL (ref 0.55–1.02)
D DIMER PPP FEU-MCNC: <0.27 UG/ML(FEU)
DIAGNOSIS, 93000: NORMAL
DIFFERENTIAL METHOD BLD: ABNORMAL
EOSINOPHIL # BLD: 0.2 K/UL (ref 0–0.4)
EOSINOPHIL NFR BLD: 1 % (ref 0–7)
EPITH CASTS URNS QL MICRO: ABNORMAL /LPF
ERYTHROCYTE [DISTWIDTH] IN BLOOD BY AUTOMATED COUNT: 13.9 % (ref 11.5–14.5)
GLOBULIN SER CALC-MCNC: 3.2 G/DL (ref 2–4)
GLUCOSE SERPL-MCNC: 99 MG/DL (ref 65–100)
GLUCOSE UR STRIP.AUTO-MCNC: NEGATIVE MG/DL
HCG UR QL: NEGATIVE
HCT VFR BLD AUTO: 44.8 % (ref 35–47)
HGB BLD-MCNC: 14.4 G/DL (ref 11.5–16)
HGB UR QL STRIP: NEGATIVE
IMM GRANULOCYTES # BLD AUTO: 0.1 K/UL (ref 0–0.04)
IMM GRANULOCYTES NFR BLD AUTO: 1 % (ref 0–0.5)
KETONES UR QL STRIP.AUTO: NEGATIVE MG/DL
LEUKOCYTE ESTERASE UR QL STRIP.AUTO: ABNORMAL
LYMPHOCYTES # BLD: 1.8 K/UL (ref 0.8–3.5)
LYMPHOCYTES NFR BLD: 14 % (ref 12–49)
MCH RBC QN AUTO: 27.9 PG (ref 26–34)
MCHC RBC AUTO-ENTMCNC: 32.1 G/DL (ref 30–36.5)
MCV RBC AUTO: 86.8 FL (ref 80–99)
MONOCYTES # BLD: 0.8 K/UL (ref 0–1)
MONOCYTES NFR BLD: 6 % (ref 5–13)
NEUTS SEG # BLD: 9.8 K/UL (ref 1.8–8)
NEUTS SEG NFR BLD: 78 % (ref 32–75)
NITRITE UR QL STRIP.AUTO: NEGATIVE
NRBC # BLD: 0 K/UL (ref 0–0.01)
NRBC BLD-RTO: 0 PER 100 WBC
P-R INTERVAL, ECG05: 128 MS
PH UR STRIP: 6 (ref 5–8)
PLATELET # BLD AUTO: 388 K/UL (ref 150–400)
PMV BLD AUTO: 9.8 FL (ref 8.9–12.9)
POTASSIUM SERPL-SCNC: 4.8 MMOL/L (ref 3.5–5.1)
PROT SERPL-MCNC: 7 G/DL (ref 6.4–8.2)
PROT UR STRIP-MCNC: NEGATIVE MG/DL
Q-T INTERVAL, ECG07: 354 MS
QRS DURATION, ECG06: 78 MS
QTC CALCULATION (BEZET), ECG08: 382 MS
RBC # BLD AUTO: 5.16 M/UL (ref 3.8–5.2)
RBC #/AREA URNS HPF: ABNORMAL /HPF (ref 0–5)
SODIUM SERPL-SCNC: 136 MMOL/L (ref 136–145)
SP GR UR REFRACTOMETRY: <1.005 (ref 1–1.03)
TROPONIN I SERPL HS-MCNC: 5 NG/L (ref 0–51)
TROPONIN I SERPL HS-MCNC: 6 NG/L (ref 0–51)
UA: UC IF INDICATED,UAUC: ABNORMAL
UROBILINOGEN UR QL STRIP.AUTO: 0.1 EU/DL (ref 0.1–1)
VENTRICULAR RATE, ECG03: 70 BPM
WBC # BLD AUTO: 12.7 K/UL (ref 3.6–11)
WBC URNS QL MICRO: ABNORMAL /HPF (ref 0–4)

## 2023-03-13 PROCEDURE — 81025 URINE PREGNANCY TEST: CPT

## 2023-03-13 PROCEDURE — 99285 EMERGENCY DEPT VISIT HI MDM: CPT

## 2023-03-13 PROCEDURE — 93005 ELECTROCARDIOGRAM TRACING: CPT

## 2023-03-13 PROCEDURE — 80053 COMPREHEN METABOLIC PANEL: CPT

## 2023-03-13 PROCEDURE — 96375 TX/PRO/DX INJ NEW DRUG ADDON: CPT

## 2023-03-13 PROCEDURE — 74011250636 HC RX REV CODE- 250/636: Performed by: EMERGENCY MEDICINE

## 2023-03-13 PROCEDURE — 84484 ASSAY OF TROPONIN QUANT: CPT

## 2023-03-13 PROCEDURE — 85379 FIBRIN DEGRADATION QUANT: CPT

## 2023-03-13 PROCEDURE — 74011250637 HC RX REV CODE- 250/637: Performed by: EMERGENCY MEDICINE

## 2023-03-13 PROCEDURE — 70450 CT HEAD/BRAIN W/O DYE: CPT

## 2023-03-13 PROCEDURE — 81001 URINALYSIS AUTO W/SCOPE: CPT

## 2023-03-13 PROCEDURE — 85025 COMPLETE CBC W/AUTO DIFF WBC: CPT

## 2023-03-13 PROCEDURE — 96374 THER/PROPH/DIAG INJ IV PUSH: CPT

## 2023-03-13 PROCEDURE — 71045 X-RAY EXAM CHEST 1 VIEW: CPT

## 2023-03-13 PROCEDURE — 36415 COLL VENOUS BLD VENIPUNCTURE: CPT

## 2023-03-13 RX ORDER — DIPHENHYDRAMINE HYDROCHLORIDE 50 MG/ML
12.5 INJECTION, SOLUTION INTRAMUSCULAR; INTRAVENOUS ONCE
Status: COMPLETED | OUTPATIENT
Start: 2023-03-13 | End: 2023-03-13

## 2023-03-13 RX ORDER — PROCHLORPERAZINE EDISYLATE 5 MG/ML
10 INJECTION INTRAMUSCULAR; INTRAVENOUS
Status: COMPLETED | OUTPATIENT
Start: 2023-03-13 | End: 2023-03-13

## 2023-03-13 RX ORDER — ACETAMINOPHEN 500 MG
1000 TABLET ORAL ONCE
Status: COMPLETED | OUTPATIENT
Start: 2023-03-13 | End: 2023-03-13

## 2023-03-13 RX ORDER — KETOROLAC TROMETHAMINE 30 MG/ML
30 INJECTION, SOLUTION INTRAMUSCULAR; INTRAVENOUS ONCE
Status: COMPLETED | OUTPATIENT
Start: 2023-03-13 | End: 2023-03-13

## 2023-03-13 RX ADMIN — PROCHLORPERAZINE EDISYLATE 10 MG: 5 INJECTION INTRAMUSCULAR; INTRAVENOUS at 19:53

## 2023-03-13 RX ADMIN — DIPHENHYDRAMINE HYDROCHLORIDE 12.5 MG: 50 INJECTION, SOLUTION INTRAMUSCULAR; INTRAVENOUS at 19:54

## 2023-03-13 RX ADMIN — SODIUM CHLORIDE 1000 ML: 9 INJECTION, SOLUTION INTRAVENOUS at 19:36

## 2023-03-13 RX ADMIN — KETOROLAC TROMETHAMINE 30 MG: 30 INJECTION, SOLUTION INTRAMUSCULAR; INTRAVENOUS at 19:54

## 2023-03-13 RX ADMIN — ACETAMINOPHEN 1000 MG: 500 TABLET ORAL at 19:53

## 2023-03-13 NOTE — ED TRIAGE NOTES
Pt arrives to ED with family. Pt reports possible syncopal episode at home, followed by chest pain and headache. Pt reports PMH of open heart surgery in 1995.

## 2023-03-14 NOTE — ED PROVIDER NOTES
[facility] EMERGENCY DEPT  EMERGENCY DEPARTMENT HISTORY AND PHYSICAL EXAM      Date: 3/13/2023  Patient Name: Sophia Santa  MRN: [id number]  [place]: 1995  Date of evaluation: 3/13/2023  Provider: Alvin Ding MD   Note Started: 11:48 PM 3/13/23    HISTORY OF PRESENT ILLNESS     Chief Complaint   Patient presents with    Chest Pain    Syncope       History Provided By: Patient    HPI: Sophia Santa, 28 y.o. female VSD s/p repair, migraine headaches is for episode of syncope. Patient had chest pain as well. No blood thinners. Did have a history of VSD repair as a child. Has followed up with cardiology without any issues since this time. Has had other episodes of chest pain similar to this. She feels warm and like she will pass out and then return to baseline after syncopized seeing. States to have a frequently when she was pregnant with her son. Also patient reports that she has had a migraine recently for about 1 week. She has a history of migraines and this is out of the ordinary with for her however has lasted slightly longer than normal.  She has recently been on a new medication that has been controlling her migraines.     PAST MEDICAL HISTORY   Past Medical History:  Past Medical History:   Diagnosis Date    Abnormal Pap smear     2013    Abnormal Papanicolaou smear of cervix     follow up normal    Anxiety     Fatigue     Heart abnormality     Memory loss     Neurological disorder     migraines    Other ill-defined conditions(799.89)     Bladder reflux    Pap smear for cervical cancer screening 7/10/17 neg    Ringing in ears     Thyroid activity decreased     Thyroid disease     hypothyroidism in first pregnancy    VSD (ventricular septal defect)        Past Surgical History:  Past Surgical History:   Procedure Laterality Date    HX OTHER SURGICAL      WA CARDIAC SURG PROCEDURE UNLIST      VSD repair       Family History:  Family History   Problem Relation Age of Onset    Cancer Maternal Grandmother         lung cancer    Diabetes Maternal Grandmother     Hypertension Maternal Grandmother     Breast Cancer Other        Social History:  Social History     Tobacco Use    Smoking status: Former     Packs/day: 1.00     Types: Cigarettes     Quit date: 2020     Years since quittin.8    Smokeless tobacco: Current   Substance Use Topics    Alcohol use: No    Drug use: No       Allergies:  No Known Allergies    PCP: Kisha Acuña    Current Meds:   Previous Medications    ACETAMINOPHEN (TYLENOL) 325 MG TABLET    Take  by mouth every four (4) hours as needed for Pain. AJOVY AUTOINJECTOR 225 MG/1.5 ML AUTO-INJECTOR    INJECT 1.5 ML UNDER THE SKIN EVERY MONTH    CALCIUM CARBONATE (TUMS) 200 MG CALCIUM (500 MG) CHEW    Take 1 Tab by mouth daily. GABAPENTIN (NEURONTIN) 300 MG CAPSULE    Take 1 capsule by mouth nightly. UBROGEPANT (UBRELVY) 100 MG TABLET    1 at HA onset and repeat in 2 hours if needed. Max 2 in 24 hours       REVIEW OF SYSTEMS     Positives and Pertinent negatives as per HPI. PHYSICAL EXAM     ED Triage Vitals [23 1612]   ED Encounter Vitals Group      /71      Pulse (Heart Rate) 86      Resp Rate 18      Temp 98.4 °F (36.9 °C)      Temp src       O2 Sat (%) 100 %      Weight 167 lb      Height 5' 2\"      Physical Exam  Vitals and nursing note reviewed. Constitutional:       Appearance: Normal appearance. She is normal weight. HENT:      Head: Normocephalic and atraumatic. Nose: Nose normal.      Mouth/Throat:      Mouth: Mucous membranes are moist.   Eyes:      Conjunctiva/sclera: Conjunctivae normal.   Cardiovascular:      Rate and Rhythm: Normal rate and regular rhythm. Pulses: Normal pulses. Heart sounds: Normal heart sounds. Pulmonary:      Effort: Pulmonary effort is normal. No respiratory distress. Breath sounds: Normal breath sounds. Musculoskeletal:         General: No swelling or deformity. Normal range of motion. Skin:     General: Skin is warm and dry. Findings: No rash. Neurological:      General: No focal deficit present. Mental Status: She is alert and oriented to person, place, and time. Cranial Nerves: No cranial nerve deficit. Sensory: No sensory deficit. Motor: No weakness.       Coordination: Coordination normal.      Gait: Gait normal.   Psychiatric:         Mood and Affect: Mood normal.         Behavior: Behavior normal.        ED COURSE and DIFFERENTIAL DIAGNOSIS/MDM   Records Reviewed (source and summary of external notes): None    Vitals:    Vitals:    03/13/23 1612 03/13/23 2137 03/13/23 2157 03/13/23 2257   BP: 118/71 (!) 108/56 116/67 120/82   Pulse: 86 74 83 80   Resp: 18 16 20 17   Temp: 98.4 °F (36.9 °C)      SpO2: 100% 98% 97% 100%   Weight: 75.8 kg (167 lb)      Height: 5' 2\" (1.575 m)          CC/HPI Summary, DDx, ED Course, and Reassessment:           Patient was given the following medications:  Medications   ketorolac (TORADOL) injection 30 mg (30 mg IntraVENous Given 3/13/23 1954)   sodium chloride 0.9 % bolus infusion 1,000 mL (0 mL IntraVENous IV Completed 3/13/23 2304)   acetaminophen (TYLENOL) tablet 1,000 mg (1,000 mg Oral Given 3/13/23 1953)   prochlorperazine (COMPAZINE) injection 10 mg (10 mg IntraVENous Given 3/13/23 1953)   diphenhydrAMINE (BENADRYL) injection 12.5 mg (12.5 mg IntraVENous Given 3/13/23 1954)          LAB, EKG AND DIAGNOSTIC RESULTS   Labs:  Recent Results (from the past 12 hour(s))   EKG, 12 LEAD, INITIAL    Collection Time: 03/13/23  4:15 PM   Result Value Ref Range    Ventricular Rate 70 BPM    Atrial Rate 70 BPM    P-R Interval 128 ms    QRS Duration 78 ms    Q-T Interval 354 ms    QTC Calculation (Bezet) 382 ms    Calculated P Axis 24 degrees    Calculated R Axis 63 degrees    Calculated T Axis 65 degrees    Diagnosis       Normal sinus rhythm  Normal ECG  No previous ECGs available  Confirmed by Sully Garland ([id number]) on 3/13/2023 4:27:38 PM     CBC WITH AUTOMATED DIFF    Collection Time: 03/13/23  4:38 PM   Result Value Ref Range    WBC 12.7 (H) 3.6 - 11.0 K/uL    RBC 5.16 3.80 - 5.20 M/uL    HGB 14.4 11.5 - 16.0 g/dL    HCT 44.8 35.0 - 47.0 %    MCV 86.8 80.0 - 99.0 FL    MCH 27.9 26.0 - 34.0 PG    MCHC 32.1 30.0 - 36.5 g/dL    RDW 13.9 11.5 - 14.5 %    PLATELET 222 155 - 261 K/uL    MPV 9.8 8.9 - 12.9 FL    NRBC 0.0 0.0  WBC    ABSOLUTE NRBC 0.00 0.00 - 0.01 K/uL    NEUTROPHILS 78 (H) 32 - 75 %    LYMPHOCYTES 14 12 - 49 %    MONOCYTES 6 5 - 13 %    EOSINOPHILS 1 0 - 7 %    BASOPHILS 0 0 - 1 %    IMMATURE GRANULOCYTES 1 (H) 0 - 0.5 %    ABS. NEUTROPHILS 9.8 (H) 1.8 - 8.0 K/UL    ABS. LYMPHOCYTES 1.8 0.8 - 3.5 K/UL    ABS. MONOCYTES 0.8 0.0 - 1.0 K/UL    ABS. EOSINOPHILS 0.2 0.0 - 0.4 K/UL    ABS. BASOPHILS 0.0 0.0 - 0.1 K/UL    ABS. IMM. GRANS. 0.1 (H) 0.00 - 0.04 K/UL    DF AUTOMATED     METABOLIC PANEL, COMPREHENSIVE    Collection Time: 03/13/23  4:38 PM   Result Value Ref Range    Sodium 136 136 - 145 mmol/L    Potassium 4.8 3.5 - 5.1 mmol/L    Chloride 107 97 - 108 mmol/L    CO2 23 21 - 32 mmol/L    Anion gap 6 5 - 15 mmol/L    Glucose 99 65 - 100 mg/dL    BUN 11 6 - 20 mg/dL    Creatinine 0.56 0.55 - 1.02 mg/dL    BUN/Creatinine ratio 20 12 - 20      eGFR >60 >60 ml/min/1.73m2    Calcium 9.3 8.5 - 10.1 mg/dL    Bilirubin, total 0.2 0.2 - 1.0 mg/dL    AST (SGOT) 17 15 - 37 U/L    ALT (SGPT) 16 12 - 78 U/L    Alk.  phosphatase 86 45 - 117 U/L    Protein, total 7.0 6.4 - 8.2 g/dL    Albumin 3.8 3.5 - 5.0 g/dL    Globulin 3.2 2.0 - 4.0 g/dL    A-G Ratio 1.2 1.1 - 2.2     TROPONIN-HIGH SENSITIVITY    Collection Time: 03/13/23  4:38 PM   Result Value Ref Range    Troponin-High Sensitivity 6 0 - 51 ng/L   URINALYSIS W/ REFLEX CULTURE    Collection Time: 03/13/23  6:07 PM    Specimen: Urine   Result Value Ref Range    Color Yellow/Straw      Appearance Clear Clear      Specific gravity <1.005 1.003 - 1.030    pH (UA) 6.0 5.0 - 8.0      Protein Negative Negative mg/dL    Glucose Negative Negative mg/dL    Ketone Negative Negative mg/dL    Bilirubin Negative Negative      Blood Negative Negative      Urobilinogen 0.1 0.1 - 1.0 EU/dL    Nitrites Negative Negative      Leukocyte Esterase Trace (A) Negative      WBC 0-4 0 - 4 /hpf    RBC 0-5 0 - 5 /hpf    Epithelial cells Few Few /lpf    Bacteria Negative Negative /hpf    UA:UC IF INDICATED Culture not indicated by UA result Culture not indicated by UA result     D DIMER    Collection Time: 03/13/23  6:08 PM   Result Value Ref Range    D DIMER <0.27 <0.50 ug/ml(FEU)   TROPONIN-HIGH SENSITIVITY    Collection Time: 03/13/23  7:51 PM   Result Value Ref Range    Troponin-High Sensitivity 5 0 - 51 ng/L   POC HCG,URINE    Collection Time: 03/13/23  9:49 PM   Result Value Ref Range    Pregnancy test,urine (POC) Negative Negative         Radiologic Studies:  Non-plain film images such as CT, Ultrasound and MRI are read by the radiologist. Plain radiographic images are visualized and preliminarily interpreted by the ED Provider with the below findings:        Interpretation per the Radiologist below, if available at the time of this note:  CT HEAD WO CONT    Result Date: 3/13/2023  EXAM: CT HEAD WO CONT INDICATION: headache, syncope COMPARISON: March 19, 2020. CONTRAST: None. TECHNIQUE: Unenhanced CT of the head was performed using 5 mm images. Brain and bone windows were generated. Coronal and sagittal reformats. CT dose reduction was achieved through use of a standardized protocol tailored for this examination and automatic exposure control for dose modulation. FINDINGS: The ventricles and sulci are normal in size, shape and configuration. There is no significant white matter disease. There is no intracranial hemorrhage, extra-axial collection, or mass effect. The basilar cisterns are open. No CT evidence of acute infarct. The bone windows demonstrate no abnormalities.  The visualized portions of the paranasal sinuses and mastoid air cells are clear. No acute intracranial process. XR CHEST PORT    Result Date: 3/13/2023  EXAM:  XR CHEST PORT INDICATION: Chest pain. COMPARISON: 8/1/2016. TECHNIQUE: Upright portable chest AP view FINDINGS: Monitoring leads are noted. The cardiac silhouette is within normal limits. The pulmonary vasculature is within normal limits. The lungs and pleural spaces are clear. The visualized bones and upper abdomen are age-appropriate. No acute process on portable chest.       PROCEDURES   Unless otherwise noted below, none. Performed by: Aron Marin MD   Procedures        FINAL IMPRESSION     1. Migraine without status migrainosus, not intractable, unspecified migraine type    2. Syncope and collapse          DISPOSITION/PLAN   Discharged    Discharge Note: The patient is stable for discharge home. The signs, symptoms, diagnosis, and discharge instructions have been discussed, understanding conveyed, and agreed upon. The patient is to follow up as recommended or return to ER should their symptoms worsen. PATIENT REFERRED TO:  Follow-up Information       Follow up With Specialties Details Why Contact Info    [address] [facility], [address] [place] [address] In 3 days  Sadie Miwandy   [address] [id number] [facility] EMERGENCY DEPT Emergency Medicine  As needed Sadie Blackman [facility] 29  [phone]              DISCHARGE MEDICATIONS:  Current Discharge Medication List            DISCONTINUED MEDICATIONS:  Current Discharge Medication List          I am the Primary Clinician of Record: Aron Marin MD (electronically signed)    (Please note that parts of this dictation were completed with voice recognition software. Quite often unanticipated grammatical, syntax, homophones, and other interpretive errors are inadvertently transcribed by the computer software. Please disregards these errors.  Please excuse any errors that have escaped final proofreading.)

## 2023-03-14 NOTE — ED NOTES
Pt in room with family member at bedside. Pt has no complaints at this time. Pt provided with ice water and blanket.

## 2023-03-15 DIAGNOSIS — H53.9 VISUAL CHANGES: ICD-10-CM

## 2023-03-15 DIAGNOSIS — R20.0 NUMBNESS IN BOTH LEGS: ICD-10-CM

## 2023-03-15 DIAGNOSIS — G43.411 INTRACTABLE HEMIPLEGIC MIGRAINE WITH STATUS MIGRAINOSUS: Primary | ICD-10-CM

## 2023-04-24 ENCOUNTER — HOSPITAL ENCOUNTER (OUTPATIENT)
Dept: NEUROLOGY | Age: 28
Discharge: HOME | End: 2023-04-24
Attending: NURSE PRACTITIONER
Payer: COMMERCIAL

## 2023-04-24 DIAGNOSIS — H53.9 VISUAL CHANGES: ICD-10-CM

## 2023-04-24 DIAGNOSIS — R20.0 NUMBNESS IN BOTH LEGS: ICD-10-CM

## 2023-04-24 DIAGNOSIS — G43.411 INTRACTABLE HEMIPLEGIC MIGRAINE WITH STATUS MIGRAINOSUS: ICD-10-CM

## 2023-04-24 PROCEDURE — 95816 EEG AWAKE AND DROWSY: CPT

## 2023-09-20 NOTE — PROGRESS NOTES
No SOL. GFM. Plans un-medicated birth, has hx of quick labor and only had Epidural ~ 20 min prior to birth with first baby. Still working, but thinks she wants to quit this week.  SOL, C reviewed
Problem List  Date Reviewed: 2018          Codes Class Noted    Pregnancy ICD-10-CM: Z34.90  ICD-9-CM: V22.2  2014    Overview Addendum 2018  8:27 AM by SYLVESTER Denniss CNM care    FAS  WNL posterior placenta no previa, s=d  Boy!   Smoker 1 ppd  Declines Flu Vaccine  Glucola 117  Considering Tdap  GBS neg
Universal Safety Interventions

## 2023-10-31 NOTE — TELEPHONE ENCOUNTER
Samanthat, Generic 5/27/2021 4:02 PM EDT    I am talking through [employer] not my medicaid. I got that shot. But was only a one time thing while I waited on the new insurance to approve. And all their reasons for all of it is they cannot reach the dr.     ----- Message -----  From: Tavares Regalado LPN  Sent: 2/38/00 3:14 PM  To: Luis Fernando Burleson  Subject: RE: Non-Urgent Medical Question    Per Steven Stoll he stated it looks like we also have the approval on file for you. I am not sure why you are unable to get it.       ----- Message -----   From:Karina Hurst   Sent:5/27/2021 10:29 AM EDT   To:Lorne Servin NP   Subject:RE: Non-Urgent Medical Question    Has there been any info on my prior auth for my emgality it's getting close to being due again. All the mail the have sent me on it states they cannot contact the Dr which is there reason for denying the claims. ----- Message -----   From:Austin Mcrae LPN   IPAK:7/95/0656 9:27 AM EDT   To:Karina Phan   Subject:RE: Non-Urgent Medical Question    We did receive some information that was given to our PA specialist to fill our and investigate on what else was needed. Hopefully she can piece together what they are needing and give them the information that seem to be lacking. I am sorry for the inconvenience. ----- Message -----   From:Karina Phan   Sent:5/13/2021 3:23 PM EDT   To:Lorne Servin NP   Subject:RE: Non-Urgent Medical Question    Could I come by tomorrow and pick it up? I don't have time to today unfortunately       ----- Message -----   From:Lorne Servin NP   Sent:5/13/2021 3:20 PM EDT    To:Karina Morris   Subject:RE: Non-Urgent Medical Question    Sorry I see that now. Yes we would want to re-start with the double dose again unfortunately to get back to the normal level.     We can get you 2 packs (4 shots) starting now if you need?          ----- Message -----   From:Karina Walker Kind   Sent:5/13/2021 3:14 PM EDT To:Lorne Blanco NP   Subject:RE: Non-Urgent Medical Question    Last dose was March 26th.       ----- Message -----   From:Lorne Blanco NP   Sent:5/13/2021 2:57 PM EDT   To:Karina Cook   Subject:RE: Non-Urgent Medical Question    Dorothy Mendoza,     How far off are we from the previous injection? [facility]       ----- Message -----   From:Karina Pierre   Sent:5/13/2021 9:59 AM EDT   To:Lorne Blanco NP   Subject:RE: Non-Urgent Medical Question    I understand I will need a sample since insurance wants to be a pain mauro if he wants me to do 2 shots again at this point or not. I am almost a month past due for my shot.       ----- Message -----   From:Austin Lee LPN   VKVD:0/83/6113 9:49 AM EDT   To:Karina Morris   Subject:RE: Non-Urgent Medical Question    No brandon at this time that office is still closed and will be for a while. We do not have any of our staff at that location.       ----- Message -----   From:Karina Pierre   Sent:5/13/2021 9:38 AM EDT   To:Lorne Blanco NP   Subject:RE: Non-Urgent Medical Question    Is there anyway I could  a sample from [facility] in [facility]?       ----- Message -----   From:Austin Lee LPN   NMYO:6/98/5528 9:15 AM EDT   To:Karina Cook   Subject:RE: Non-Urgent Medical Question    We will send this message to our Pa specialist so that she can work on this for you.       ----- Message -----   From:Karina Pierre   Sent:5/11/2021 8:06 PM EDT   To:Lorne Blanco NP   Subject:RE: Non-Urgent Medical Question    This is what [employer] [facility] is saying. And I have been pulling overtime so haven't had a chance to come up and get the sample yet.       ----- Message -----   From:Lorne Blanco NP   Sent:5/6/2021 9:05 AM EDT   To:Karina Morris   Subject:RE: Non-Urgent Medical Question    Done!    Letme know if you have any problems getting anything       ----- Message -----   From:Karina Pierre   Sent:5/5/2021 9:20 PM EDT   To:Lorne Russo DEE Donald   Subject:RE: Non-Urgent Medical Question      Go ahead and send the gabapentin to the mail order that should go through fairly quick. It showing as covered on the website with no prior auth needed so should be no problem. ----- Message -----   From:Lorne Canseco NP   Sent:5/5/2021 3:36 PM EDT   To:Karina Peña   Subject:RE: Non-Urgent Medical Question    Done!      ----- Message -----   From:Karina Morris   Sent:5/5/2021 10:18 AM EDT   To:Lorne Canseco NP   Subject:RE: Non-Urgent Medical Question    Can you send to [facility] as well? [facility] doesn't take my new insurance unless you can run under just medicaid. If so go ahead and send the emgality to them under the medicaid as well if that's possible.       ----- Message -----   From:Lorne Canseco NP   Sent:5/5/2021 10:07 AM EDT   To:Karina Morris   Subject:RE: Non-Urgent Medical Question    Sent 300 mg Gabapentin capsules to [facility] for night time use    Rotating heat and ice is the best for pain relief   Also can get some OTC patches like salonpas or lidocaine patches that can help       ----- Message -----   From:Karina Morris   Sent:5/4/2021 4:28 PM EDT   To:Lorne Canseco NP   Subject:RE: Non-Urgent Medical Question    Also would ice or heat work better to help with pain?       ----- Message -----   From:Lorne Canseco NP   Sent:5/4/2021 3:47 PM EDT   To:Karina Morris   Subject:RE: Non-Urgent Medical Question    Are you having any current physical pain in the back or legs or feet? Like we discussed, likely coming from the back. The conservative therapy can help so if you want to try chiropractor and we can even get in for PT we can do both. Medication wise we use muscle relaxers or gabapentin usually just dosed at bedtime to help with sleep and comfort but also relieve some of the symptoms. Care to look into any of that?      As for [facility], we can get you a sample while we are figuring it out also and waiting for the reason on why it was denied. ...... ----- Message -----   From:Karina Amato   Sent:5/4/2021 3:28 PM EDT   To:Lorne Kathleen NP   Subject:RE: Non-Urgent Medical Question    I can try but with the accommodation process I have to be out of work while they accommodate me and there is no positivity they even can accommodate me. And I can't be on any medication that can alter mental state in any way withing 8 hours of coming to work which drops the pain treatment meds for me as well. And Naproxen helps some but not always. And I really cannot afford to be out for an extended period of time. ----- Message -----   From:Lorne Kathleen NP   Sent:5/4/2021 3:18 PM EDT   To:Karina Morris   Subject:RE: Non-Urgent Medical Question    We can change your role with accommodations if they let you and you could think of something else you can do while we get some treatment under way? You would have to talk to them and let me know? As for chiropractor, you can try but with the symptoms that may make things worse.        ----- Message -----   From:Karina Morris   Sent:5/4/2021 1:16 PM EDT   To:Lorne Kathleen NP   Subject:RE: Non-Urgent Medical Question    So chiro could aggravate it? And unless I have accommodations I honestly can't. But from experience it hurts more when I am on the  than in any other role.       ----- Message -----   From:Lorne Kathleen NP   Sent:5/4/2021 12:57 PM EDT   To:Karina Morris   Subject:RE: Non-Urgent Medical Question    Not sure a chiropractor would be able to do enough to help what is going on and re-adjusting may cause more issue if the nerve is pinched. PT would do stretching, exercising, dry needling, etc.   Pain management could do an injection that would likely really work well if needed. The work issue may make things worse.    Can we change roles for a little but and see If that helps?      ----- Message -----   From:Karina Morris   Sent:5/4/2021 11:47 AM EDT   To:Lorne Ramírez NP   Subject:RE: Non-Urgent Medical Question    So would the pt be for the lumbar? If so will just seeing the chiro work?       ----- Message -----   From:Lorne Ramírez NP   Sent:5/4/2021 11:41 AM EDT   To:Karina Werner   Subject:RE: Non-Urgent Medical Question    I will keep a lookout for the denial and reasons. The MRI lumbar spine showed a significantly bulging disc with a annular tear (rip) in the disc at level l5-s1. This is pinching on the sciatic nerve on the right side. Likely the spot of pain and where the symptoms are coming from. The thoracic spine (middle back) has some arthritis but nothing causing any issues. When we find this we usually can do PT, medications. If no changes or has been done before we then go to pain management when they can do an epidural injection around the site to help pain and symptom relief. What do you think?      ----- Message -----   From:Karina Werner   Sent:5/4/2021 10:36 AM EDT   To:Lorne Ramírez NP   Subject:RE: Non-Urgent Medical Question    And has [facility] Sent over the results of the mri yet?       ----- Message -----   From:Lorne Ramírez NP   Sent:5/4/2021 10:30 AM EDT   To:Karina Morris   Subject:RE: Non-Urgent Medical Question    Matilda Moscoso,     Did they say why? Or give you a letter or any alternative? I do not see anything in our chart yet?      ----- Message -----   From:aKrina Werner   Sent:5/3/2021 7:57 PM EDT   To:Lonre Ramírez NP   Subject:RE: Non-Urgent Medical Question    So the emgality was denied through my insurance this go around. This is what they radha I talked to said.       ----- Message -----    From:Lorne Ramírez NP   Sent:5/3/2021 10:53 AM EDT   To:Karina Werner   Subject:RE: Non-Urgent Medical Question    Diamante Tammie your Emgality. [facility]     I will try to get the results now        ----- Message -----   From:Karina Morris   Sent:5/3/2021 10:24 AM EDT   To:Lorne Saldaña NP   Subject:RE: Non-Urgent Medical Question    I have already had the mri and please send that to [employer] [facility]. I am just waiting on results for the mri      ----- Message -----   From:Lorne Saldaña NP   Sent:5/3/2021 9:53 AM EDT   To:Karina Suh   Subject:RE: Non-Urgent Medical Question    Ngozi Isabella,       The Emgality was approved on February 20, 2021   We have that in our system. Do you need me to send to the mail order? Also for the MRI. Looks like they tried to call you to set up    Call [phone] and they can get you set up     Letme know if you can not get through       Guero Mcdonald       ----- Message -----   From:Karina Suh   Sent:5/2/2021 12:19 PM EDT   To:Lorne Saldaña NP   Subject:Non-Urgent Medical Question    Wondering if you have gotten anything on mri or the prior auth for emgality?  They called but nothing showing up on my [employer] website about it still saying delayed impaired balance/cognition/impaired coordination/impaired postural control/decreased strength

## 2023-12-12 RX ORDER — FREMANEZUMAB-VFRM 225 MG/1.5ML
INJECTION SUBCUTANEOUS
Qty: 4.5 ML | Refills: 3 | Status: SHIPPED | OUTPATIENT
Start: 2023-12-12

## 2024-10-18 ENCOUNTER — TELEMEDICINE (OUTPATIENT)
Age: 29
End: 2024-10-18
Payer: COMMERCIAL

## 2024-10-18 DIAGNOSIS — G89.29 CHRONIC RIGHT-SIDED LOW BACK PAIN WITH RIGHT-SIDED SCIATICA: ICD-10-CM

## 2024-10-18 DIAGNOSIS — M54.41 CHRONIC RIGHT-SIDED LOW BACK PAIN WITH RIGHT-SIDED SCIATICA: ICD-10-CM

## 2024-10-18 DIAGNOSIS — G43.411 HEMIPLEGIC MIGRAINE, INTRACTABLE, WITH STATUS MIGRAINOSUS: Primary | ICD-10-CM

## 2024-10-18 DIAGNOSIS — R55 SYNCOPE, UNSPECIFIED SYNCOPE TYPE: ICD-10-CM

## 2024-10-18 PROCEDURE — 99204 OFFICE O/P NEW MOD 45 MIN: CPT | Performed by: NURSE PRACTITIONER

## 2024-10-18 RX ORDER — FREMANEZUMAB-VFRM 225 MG/1.5ML
225 INJECTION SUBCUTANEOUS
Qty: 4.5 ML | Refills: 3 | Status: SHIPPED | OUTPATIENT
Start: 2024-10-18

## 2024-10-18 RX ORDER — RIMEGEPANT SULFATE 75 MG/75MG
75 TABLET, ORALLY DISINTEGRATING ORAL PRN
Qty: 8 TABLET | Refills: 2 | Status: SHIPPED | OUTPATIENT
Start: 2024-10-18

## 2024-10-18 NOTE — PROGRESS NOTES
Coordination:  No resting or intention tremor    Gait and Station:  No muscle wasting or fasiculations noted.      Reflexes:  Not assessed    Assessment & Plan:      Diagnosis Orders   1. Hemiplegic migraine, intractable, with status migrainosus  Fremanezumab-vfrm (AJOVY) 225 MG/1.5ML SOAJ    rimegepant sulfate (NURTEC) 75 MG TBDP      2. Chronic right-sided low back pain with right-sided sciatica  [facility] - Physical Therapy at [facility]      3. Syncope, unspecified syncope type          Hemiplegic migraine with occurrences about three times a month on Ajovy. Prior to this, she was having migraines >15 days per month. She has had trials of Emgality with loss of effectiveness over time, Topamax prescribed by Dr. Eckert which was ineffective, and Elavil caused too much sedation the following day. Will continue with Ajovy for prevention. Due to hemiplegic migraine, triptan medication and DHE are contraindicated. Will try to get Nurtec for acute management instead. She was provided education on migraine today to include aura, prodrome, potential triggers, non-pharmacologic and pharmacologic treatment, and pathophysiology of migraine. Written information was provided as well. She will track her headaches and bring the headache diary with her to her next office visit.     Syncope of unknown etiology with normal MRI and EEG. She indicates that she did have ANS testing which she indicates was also normal.     She continues to have lower back pain with some radicular symptoms down the right leg. The severity of this will wax/wane but is still constant and overall this is not different than it was when she had the evaluation with MRI of the thoracic and lumbar spine in 2021, results not available, and EMG which was normal. Will try to get the results of the imaging for review. She does see a chiropractor to help with this. Will refer to PT for this.     Follow up in three months.       We

## 2024-10-21 ENCOUNTER — TELEPHONE (OUTPATIENT)
Age: 29
End: 2024-10-21

## 2024-10-28 ENCOUNTER — TELEPHONE (OUTPATIENT)
Age: 29
End: 2024-10-28

## 2024-10-28 NOTE — TELEPHONE ENCOUNTER
Nurte approved by [employer]  09-22-24 to 10-22-25  Case [id number]    Qty 8 per 28 days     Rt fax to [facility]   : [phone]     Fyi to nurse also

## 2024-10-30 ENCOUNTER — PATIENT MESSAGE (OUTPATIENT)
Age: 29
End: 2024-10-30

## 2024-11-08 ENCOUNTER — TELEPHONE (OUTPATIENT)
Age: 29
End: 2024-11-08

## 2024-11-08 DIAGNOSIS — G43.411 HEMIPLEGIC MIGRAINE, INTRACTABLE, WITH STATUS MIGRAINOSUS: ICD-10-CM

## 2024-11-08 RX ORDER — FREMANEZUMAB-VFRM 225 MG/1.5ML
225 INJECTION SUBCUTANEOUS
Qty: 1 ADJUSTABLE DOSE PRE-FILLED PEN SYRINGE | Refills: 5 | Status: SHIPPED | OUTPATIENT
Start: 2024-11-08

## 2024-11-08 RX ORDER — FREMANEZUMAB-VFRM 225 MG/1.5ML
225 INJECTION SUBCUTANEOUS
Qty: 1 ADJUSTABLE DOSE PRE-FILLED PEN SYRINGE | Refills: 5 | Status: SHIPPED | OUTPATIENT
Start: 2024-11-08 | End: 2024-11-08 | Stop reason: SDUPTHER

## 2024-11-08 NOTE — TELEPHONE ENCOUNTER
Called in [place] for Ajovy to [facility]     Auth# [id number]  10-9-24 to 11-8-25    Rt fax to [facility]   [phone]     [employer] approved this for 30 days 1.5 ml.  Unable to submit it for 90 day supply.     Please revise Rx.

## 2024-11-11 ENCOUNTER — TELEPHONE (OUTPATIENT)
Age: 29
End: 2024-11-11

## 2024-11-11 NOTE — TELEPHONE ENCOUNTER
Ajovy approval for auto injector     10-9-24 to 11-8-25    Case [id number] from [facility].     Rt fax to [employer]   [phone]     Letter in chart

## 2024-11-11 NOTE — TELEPHONE ENCOUNTER
Jessy    Note from payer: CaseId:[id number];Status:Approved;Review Type:Prior Auth;Coverage Start Date:09/22/2024;Coverage End Date:10/22/2025;  Approval Details     Authorized from September 22, 2024 to October 22, 2025c approval     Rt fax to [facility]  [phone]

## 2024-11-22 ENCOUNTER — HOSPITAL ENCOUNTER (OUTPATIENT)
Facility: HOSPITAL | Age: 29
Setting detail: RECURRING SERIES
Discharge: HOME | End: 2024-11-25
Payer: COMMERCIAL

## 2024-11-22 PROCEDURE — 97110 THERAPEUTIC EXERCISES: CPT

## 2024-11-22 PROCEDURE — 97161 PT EVAL LOW COMPLEX 20 MIN: CPT

## 2024-11-22 NOTE — THERAPY EVALUATION
strength, and decreased overall functional mobility with bending and lifting tasks. Pt would benefit from skilled physical therapy services to address deficits mentioned above for improvements in functional mobility.     Evaluation Complexity:  History:  LOW Complexity : Zero comorbidities / personal factors that will impact the outcome / POC; Examination:  HIGH Complexity : 4+ Standardized tests and measures addressing body structure, function, activity limitation and / or participation in recreation  ;Presentation:  MEDIUM Complexity : Evolving with changing characteristics  ;Clinical Decision Making:  Other outcome measures AMPAC: 32.19%  LOW  Overall Complexity Rating: LOW   Problem List: pain affecting function, decrease strength, impaired gait/balance, decrease ADL/functional abilities, decrease activity tolerance, decrease flexibility/joint mobility, and decrease transfer abilities    Treatment Plan may include any combination of the followin Therapeutic Exercise, 56279 Neuromuscular Re-Education, 74085 Manual Therapy, and 98478 Therapeutic Activity  Patient / Family readiness to learn indicated by: trying to perform skills and return demonstration   Persons(s) to be included in education: patient (P)  Barriers to Learning/Limitations: none  Measures taken if barriers to learning present: N/A  Patient Self Reported Health Status: good  Rehabilitation Potential: good    Short Term Goals: To be accomplished in 6 treatments.  Pt will be independent and compliant with HEP.  Pt will be able to perform all lumbar AROM to 100% without complaints of tightness or pain to facilitate improved tolerance with ADLs.  Long Term Goals: To be accomplished in 12 treatments.  Pt will be able to increase B hip strength to 5/5 to facilitate improved tolerance with lifting tasks.  Pt will be able to lift up to 50# with good body mechanics and pain no greater than 2/10 to facilitate improved performance of work related

## 2024-12-06 ENCOUNTER — HOSPITAL ENCOUNTER (OUTPATIENT)
Facility: HOSPITAL | Age: 29
Setting detail: RECURRING SERIES
Discharge: HOME | End: 2024-12-09
Payer: COMMERCIAL

## 2024-12-06 DIAGNOSIS — G43.411 HEMIPLEGIC MIGRAINE, INTRACTABLE, WITH STATUS MIGRAINOSUS: ICD-10-CM

## 2024-12-06 PROCEDURE — 97110 THERAPEUTIC EXERCISES: CPT

## 2024-12-06 RX ORDER — FREMANEZUMAB-VFRM 225 MG/1.5ML
INJECTION SUBCUTANEOUS
Qty: 4.5 ML | Refills: 3 | OUTPATIENT
Start: 2024-12-06

## 2024-12-06 NOTE — PROGRESS NOTES
PHYSICAL THERAPY - DAILY TREATMENT NOTE (updated 3/23)      Date: 2024          Patient Name:  Teresa Sheehan :  1995   Medical   Diagnosis:  Chronic right-sided low back pain with right-sided sciatica [M54.41, G89.29] Treatment Diagnosis:  M54.41  LUMBAGO WITH SCIATICA, RIGHT SIDE    Referral Source:  Chapis Gaines APRN - NP Insurance:   Payor: BCBS [place] / Plan: [employer] PPO [place] LOCAL / Product Type: *No Product type* /                     Patient  verified yes     Visit #   Current  / Total 2 12   Time   In / Out 840 915   Total Treatment Time 35   Total Timed Codes 32         SUBJECTIVE    Pain Level (0-10 scale): 4-5    Any medication changes, allergies to medications, adverse drug reactions, diagnosis change, or new procedure performed?: [x] No    [] Yes (see summary sheet for update)  Medications: Verified on Patient Summary List    Subjective functional status/changes:     \"Been working more, right side goes numb with prolonged sitting.\"    OBJECTIVE      Therapeutic Procedures:  Tx Min Billable or 1:1 Min (if diff from Tx Min) Procedure, Rationale, Specifics   32  92008 Therapeutic Exercise (timed):  increase ROM, strength, coordination, balance, and proprioception to improve patient's ability to progress to PLOF and address remaining functional goals. (see flow sheet as applicable)     Details if applicable:       70893 Manual Therapy (timed):  decrease pain and increase tissue extensibility to improve patient's ability to progress to PLOF and address remaining functional goals.  The manual therapy interventions were performed at a separate and distinct time from the therapeutic activities interventions . (see flow sheet as applicable)     Details if applicable:           Details if applicable:           Details if applicable:            Details if applicable:     32     Total Total         [x]  Patient Education billed concurrently with other procedures   [x] Review HEP    []

## 2024-12-13 ENCOUNTER — HOSPITAL ENCOUNTER (OUTPATIENT)
Facility: HOSPITAL | Age: 29
Setting detail: RECURRING SERIES
Discharge: HOME | End: 2024-12-16
Payer: COMMERCIAL

## 2024-12-13 PROCEDURE — 97110 THERAPEUTIC EXERCISES: CPT

## 2024-12-13 NOTE — PROGRESS NOTES
PHYSICAL THERAPY - DAILY TREATMENT NOTE (updated 3/23)      Date: 2024          Patient Name:  Teresa Sheehan :  1995   Medical   Diagnosis:  Chronic right-sided low back pain with right-sided sciatica [M54.41, G89.29] Treatment Diagnosis:  M54.41  LUMBAGO WITH SCIATICA, RIGHT SIDE    Referral Source:  Chapis Gaines APRN - NP Insurance:   Payor: BCBS / Plan: BCBS OUT OF STATE / Product Type: *No Product type* /                     Patient  verified yes     Visit #   Current  / Total 3 12   Time   In / Out 835 915   Total Treatment Time 40   Total Timed Codes 38         SUBJECTIVE    Pain Level (0-10 scale): 3-4    Any medication changes, allergies to medications, adverse drug reactions, diagnosis change, or new procedure performed?: [x] No    [] Yes (see summary sheet for update)  Medications: Verified on Patient Summary List    Subjective functional status/changes:     Pt reports she is feeling stiff today, pain is minimal.     OBJECTIVE      Therapeutic Procedures:  Tx Min Billable or 1:1 Min (if diff from Tx Min) Procedure, Rationale, Specifics   38  08583 Therapeutic Exercise (timed):  increase ROM, strength, coordination, balance, and proprioception to improve patient's ability to progress to PLOF and address remaining functional goals. (see flow sheet as applicable)     Details if applicable:       65421 Manual Therapy (timed):  decrease pain and increase tissue extensibility to improve patient's ability to progress to PLOF and address remaining functional goals.  The manual therapy interventions were performed at a separate and distinct time from the therapeutic activities interventions . (see flow sheet as applicable)     Details if applicable:           Details if applicable:           Details if applicable:            Details if applicable:     38     Total Total         [x]  Patient Education billed concurrently with other procedures   [x] Review HEP    [] Progressed/Changed HEP, detail:

## 2024-12-20 ENCOUNTER — HOSPITAL ENCOUNTER (OUTPATIENT)
Facility: HOSPITAL | Age: 29
Setting detail: RECURRING SERIES
Discharge: HOME | End: 2024-12-23
Payer: COMMERCIAL

## 2024-12-20 PROCEDURE — 97110 THERAPEUTIC EXERCISES: CPT

## 2024-12-20 NOTE — PROGRESS NOTES
[facility]  [address], [address]  [place] [address]  Phone: [phone]    Fax: [phone]    PHYSICAL THERAPY PROGRESS NOTE  Patient Name:  Teresa Sheehan :  1995   Treatment/Medical Diagnosis: Chronic right-sided low back pain with right-sided sciatica [M54.41, G89.29]   Referral Source:  Chapis Gaines APRN - NP     Date of Initial Visit:  24 Attended Visits:  4 Missed Visits:  0     SUMMARY OF TREATMENT/ASSESSMENT:  Pt is a pleasant 29 y.o. female who has been receiving physical therapy services to address mechanical low back pain with radicular symptoms. Pt demonstrates improvements in mobility and LE strengthening, but continues to be limited in all areas with continued elevated pain. Patient will continue to benefit from skilled PT / OT services to modify and progress therapeutic interventions, analyze and address functional mobility deficits, and analyze and address strength deficits to address functional deficits and attain remaining goals.    CURRENT STATUS/GOALS  Short Term Goals: To be accomplished in 6 treatments.  Pt will be independent and compliant with HEP. - in progress 24  Pt will be able to perform all lumbar AROM to 100% without complaints of tightness or pain to facilitate improved tolerance with ADLs.  - in progress 24  Long Term Goals: To be accomplished in 12 treatments.  Pt will be able to increase B hip strength to 5/5 to facilitate improved tolerance with lifting tasks.  - in progress 24  Pt will be able to lift up to 50# with good body mechanics and pain no greater than 2/10 to facilitate improved performance of work related tasks.  - in progress 24      RECOMMENDATIONS FOR SKILLED THERAPY  Continue at 1x/week for 8 additional visits.        Nelda Palma, PT       2024       9:18 AM    If you have any questions/comments please contact us directly at [phone].   Thank you for

## 2024-12-20 NOTE — PROGRESS NOTES
PHYSICAL THERAPY - DAILY TREATMENT NOTE (updated 3/23)      Date: 2024          Patient Name:  Teresa Sheehan :  1995   Medical   Diagnosis:  Chronic right-sided low back pain with right-sided sciatica [M54.41, G89.29] Treatment Diagnosis:  M54.41  LUMBAGO WITH SCIATICA, RIGHT SIDE    Referral Source:  Chapis Gaines APRN - NP Insurance:   Payor: CINTHIA SERRANO / Plan: C[employer]THIA SERRANO [employer]IGHMARK / Product Type: *No Product type* /                     Patient  verified yes     Visit #   Current  / Total 4 12   Time   In / Out 8:40 am 9:15 am   Total Treatment Time 35   Total Timed Codes 34         SUBJECTIVE    Pain Level (0-10 scale): 4    Any medication changes, allergies to medications, adverse drug reactions, diagnosis change, or new procedure performed?: [x] No    [] Yes (see summary sheet for update)  Medications: Verified on Patient Summary List    Subjective functional status/changes:     Pt reports she feels 30% better since starting physical therapy. Pt reports less frequent numbness and tingling sensation, but still has tightness and pain along waist line.    OBJECTIVE      Therapeutic Procedures:  Tx Min Billable or 1:1 Min (if diff from Tx Min) Procedure, Rationale, Specifics   34  73723 Therapeutic Exercise (timed):  increase ROM, strength, coordination, balance, and proprioception to improve patient's ability to progress to PLOF and address remaining functional goals. (see flow sheet as applicable)     Details if applicable:       96791 Manual Therapy (timed):  decrease pain and increase tissue extensibility to improve patient's ability to progress to PLOF and address remaining functional goals.  The manual therapy interventions were performed at a separate and distinct time from the therapeutic activities interventions . (see flow sheet as applicable)     Details if applicable:           Details if applicable:           Details if applicable:            Details if applicable:     34     Total Total

## 2024-12-27 ENCOUNTER — HOSPITAL ENCOUNTER (OUTPATIENT)
Facility: HOSPITAL | Age: 29
Setting detail: RECURRING SERIES
Discharge: HOME | End: 2024-12-30
Payer: COMMERCIAL

## 2024-12-27 PROCEDURE — 97110 THERAPEUTIC EXERCISES: CPT

## 2024-12-27 NOTE — PROGRESS NOTES
PHYSICAL THERAPY - DAILY TREATMENT NOTE (updated 3/23)      Date: 2024          Patient Name:  Teresa Sheehan :  1995   Medical   Diagnosis:  Chronic right-sided low back pain with right-sided sciatica [M54.41, G89.29] Treatment Diagnosis:  M54.41  LUMBAGO WITH SCIATICA, RIGHT SIDE    Referral Source:  Chapis Gaines APRN - NP Insurance:   Payor: CINTHIA SERRANO / Plan: CINTHIA SERRANO HIGHMARK / Product Type: *No Product type* /                     Patient  verified yes     Visit #   Current  / Total 5 12   Time   In / Out 8:30 am 9:15 am   Total Treatment Time 45   Total Timed Codes 38         SUBJECTIVE    Pain Level (0-10 scale): 3    Any medication changes, allergies to medications, adverse drug reactions, diagnosis change, or new procedure performed?: [x] No    [] Yes (see summary sheet for update)  Medications: Verified on Patient Summary List    Subjective functional status/changes:     Pt states that she is feeling better. Just feeling achy.     OBJECTIVE      Therapeutic Procedures:  Tx Min Billable or 1:1 Min (if diff from Tx Min) Procedure, Rationale, Specifics   38  26203 Therapeutic Exercise (timed):  increase ROM, strength, coordination, balance, and proprioception to improve patient's ability to progress to PLOF and address remaining functional goals. (see flow sheet as applicable)     Details if applicable:       02611 Manual Therapy (timed):  decrease pain and increase tissue extensibility to improve patient's ability to progress to PLOF and address remaining functional goals.  The manual therapy interventions were performed at a separate and distinct time from the therapeutic activities interventions . (see flow sheet as applicable)     Details if applicable:           Details if applicable:           Details if applicable:            Details if applicable:     38     Total Total         [x]  Patient Education billed concurrently with other procedures   [x] Review HEP    [] Progressed/Changed

## 2025-01-03 ENCOUNTER — HOSPITAL ENCOUNTER (OUTPATIENT)
Facility: HOSPITAL | Age: 30
Setting detail: RECURRING SERIES
Discharge: HOME | End: 2025-01-06
Payer: MEDICAID

## 2025-01-03 PROCEDURE — 97110 THERAPEUTIC EXERCISES: CPT

## 2025-01-03 NOTE — PROGRESS NOTES
PHYSICAL THERAPY - DAILY TREATMENT NOTE (updated 3/23)      Date: 1/3/2025          Patient Name:  Teresa Sheehan :  1995   Medical   Diagnosis:  Chronic right-sided low back pain with right-sided sciatica [M54.41, G89.29] Treatment Diagnosis:  M54.41  LUMBAGO WITH SCIATICA, RIGHT SIDE    Referral Source:  Chapis Gaines APRN - NP Insurance:   Payor: [facility] MEDICAID / Plan: [facility] CARDINAL CARE / Product Type: *No Product type* /                     Patient  verified yes     Visit #   Current  / Total 6 12   Time   In / Out 8:30 am 9:15 am   Total Treatment Time 45   Total Timed Codes 38         SUBJECTIVE    Pain Level (0-10 scale): 2    Any medication changes, allergies to medications, adverse drug reactions, diagnosis change, or new procedure performed?: [x] No    [] Yes (see summary sheet for update)  Medications: Verified on Patient Summary List    Subjective functional status/changes:     Pt states that she is feeling better. Just feeling a little soreness in my lowback.  OBJECTIVE      Therapeutic Procedures:  Tx Min Billable or 1:1 Min (if diff from Tx Min) Procedure, Rationale, Specifics   38  44630 Therapeutic Exercise (timed):  increase ROM, strength, coordination, balance, and proprioception to improve patient's ability to progress to PLOF and address remaining functional goals. (see flow sheet as applicable)     Details if applicable:       47718 Manual Therapy (timed):  decrease pain and increase tissue extensibility to improve patient's ability to progress to PLOF and address remaining functional goals.  The manual therapy interventions were performed at a separate and distinct time from the therapeutic activities interventions . (see flow sheet as applicable)     Details if applicable:           Details if applicable:           Details if applicable:            Details if applicable:     38     Total Total         [x]  Patient Education billed concurrently with other

## 2025-01-10 ENCOUNTER — HOSPITAL ENCOUNTER (OUTPATIENT)
Facility: HOSPITAL | Age: 30
Setting detail: RECURRING SERIES
Discharge: HOME | End: 2025-01-13
Payer: MEDICAID

## 2025-01-10 PROCEDURE — 97110 THERAPEUTIC EXERCISES: CPT

## 2025-01-10 NOTE — PROGRESS NOTES
PHYSICAL THERAPY - DAILY TREATMENT NOTE (updated 3/23)      Date: 1/10/2025          Patient Name:  Teresa Sheehan :  1995   Medical   Diagnosis:  Chronic right-sided low back pain with right-sided sciatica [M54.41, G89.29] Treatment Diagnosis:  M54.41  LUMBAGO WITH SCIATICA, RIGHT SIDE    Referral Source:  Chapis Gaines APRN - NP Insurance:   Payor: [facility] MEDICAID / Plan: [facility] CARDINAL CARE / Product Type: *No Product type* /                     Patient  verified yes     Visit #   Current  / Total 7 12   Time   In / Out 9:20 am 10:05am   Total Treatment Time 45   Total Timed Codes 38         SUBJECTIVE    Pain Level (0-10 scale): 1    Any medication changes, allergies to medications, adverse drug reactions, diagnosis change, or new procedure performed?: [x] No    [] Yes (see summary sheet for update)  Medications: Verified on Patient Summary List    Subjective functional status/changes:     Pt states that she is feeling better.    OBJECTIVE      Therapeutic Procedures:  Tx Min Billable or 1:1 Min (if diff from Tx Min) Procedure, Rationale, Specifics   38  24114 Therapeutic Exercise (timed):  increase ROM, strength, coordination, balance, and proprioception to improve patient's ability to progress to PLOF and address remaining functional goals. (see flow sheet as applicable)     Details if applicable:       26437 Manual Therapy (timed):  decrease pain and increase tissue extensibility to improve patient's ability to progress to PLOF and address remaining functional goals.  The manual therapy interventions were performed at a separate and distinct time from the therapeutic activities interventions . (see flow sheet as applicable)     Details if applicable:           Details if applicable:           Details if applicable:            Details if applicable:     38     Total Total         [x]  Patient Education billed concurrently with other procedures   [x] Review HEP    []

## 2025-01-17 ENCOUNTER — HOSPITAL ENCOUNTER (OUTPATIENT)
Facility: HOSPITAL | Age: 30
Setting detail: RECURRING SERIES
Discharge: HOME | End: 2025-01-20
Payer: MEDICAID

## 2025-01-17 PROCEDURE — 97110 THERAPEUTIC EXERCISES: CPT

## 2025-01-17 NOTE — PROGRESS NOTES
[facility]  [address], [address]  [place] [address]  Phone: [phone]    Fax: [phone]    PHYSICAL THERAPY PROGRESS NOTE  Patient Name:  Teresa Sheehan :  1995   Treatment/Medical Diagnosis: Chronic right-sided low back pain with right-sided sciatica [M54.41, G89.29]   Referral Source:  Chapis Gaines APRN - NP     Date of Initial Visit:  24 Attended Visits:  7 Missed Visits:  0     SUMMARY OF TREATMENT/ASSESSMENT:  Ms. Sheehan continues to make progress towards her goals. She reports that pain within her lower back has reduced, but continues to have some tightness along her lumbar, and right side of her trunk. Pt continues to have difficulty coming back to standing after prolonged sitting, or laying on floor. Able to lift 35# box with good form, but per patient she felt as if her LE didn't have the strength to allow her back up. She declines any episodes of knee instability.     AMPAC: 18.14%      CURRENT STATUS/GOALS    Short Term Goals: To be accomplished in 6 treatments.  1. Pt will be independent and compliant with HEP. - in progress 24  2. Pt will be able to perform all lumbar AROM to 100% without complaints of tightness or pain to facilitate improved tolerance with ADLs.  - in progress 25  Long Term Goals: To be accomplished in 12 treatments.  1. Pt will be able to increase B hip strength to 5/5 to facilitate improved tolerance with lifting tasks.  - met 25  2. Pt will be able to lift up to 50# with good body mechanics and pain no greater than 2/10 to facilitate improved performance of work related tasks.  - in progress 25        RECOMMENDATIONS FOR SKILLED THERAPY  Continue POC initiated by PT until all goals are met.          EMILIANO CARTAGENA JR, PTA       2025       8:38 AM    If you have any questions/comments please contact us directly at [phone].   Thank you for allowing us to assist in the care of

## 2025-01-17 NOTE — PROGRESS NOTES
PHYSICAL THERAPY - DAILY TREATMENT NOTE (updated 3/23)      Date: 2025          Patient Name:  Teresa Sheehan :  1995   Medical   Diagnosis:  Chronic right-sided low back pain with right-sided sciatica [M54.41, G89.29] Treatment Diagnosis:  M54.41  LUMBAGO WITH SCIATICA, RIGHT SIDE    Referral Source:  Chapis Gaines APRN - NP Insurance:   Payor: [facility] MEDICAID / Plan: [facility] CARDINAL CARE / Product Type: *No Product type* /                     Patient  verified yes     Visit #   Current  / Total 8 12   Time   In / Out 8:30 am 10:05am   Total Treatment Time 45   Total Timed Codes 38         SUBJECTIVE    Pain Level (0-10 scale): 1    Any medication changes, allergies to medications, adverse drug reactions, diagnosis change, or new procedure performed?: [x] No    [] Yes (see summary sheet for update)  Medications: Verified on Patient Summary List    Subjective functional status/changes:     Pt states that she is feeling better.    OBJECTIVE      Therapeutic Procedures:  Tx Min Billable or 1:1 Min (if diff from Tx Min) Procedure, Rationale, Specifics   38  03082 Therapeutic Exercise (timed):  increase ROM, strength, coordination, balance, and proprioception to improve patient's ability to progress to PLOF and address remaining functional goals. (see flow sheet as applicable)     Details if applicable:       97554 Manual Therapy (timed):  decrease pain and increase tissue extensibility to improve patient's ability to progress to PLOF and address remaining functional goals.  The manual therapy interventions were performed at a separate and distinct time from the therapeutic activities interventions . (see flow sheet as applicable)     Details if applicable:           Details if applicable:           Details if applicable:            Details if applicable:     38     Total Total         [x]  Patient Education billed concurrently with other procedures   [x] Review HEP    []

## 2025-01-23 ENCOUNTER — TELEMEDICINE (OUTPATIENT)
Age: 30
End: 2025-01-23
Payer: COMMERCIAL

## 2025-01-23 DIAGNOSIS — G89.29 CHRONIC RIGHT-SIDED LOW BACK PAIN WITH RIGHT-SIDED SCIATICA: ICD-10-CM

## 2025-01-23 DIAGNOSIS — M54.41 CHRONIC RIGHT-SIDED LOW BACK PAIN WITH RIGHT-SIDED SCIATICA: ICD-10-CM

## 2025-01-23 DIAGNOSIS — R55 SYNCOPE, UNSPECIFIED SYNCOPE TYPE: ICD-10-CM

## 2025-01-23 DIAGNOSIS — G43.411 HEMIPLEGIC MIGRAINE, INTRACTABLE, WITH STATUS MIGRAINOSUS: Primary | ICD-10-CM

## 2025-01-23 PROCEDURE — 99214 OFFICE O/P EST MOD 30 MIN: CPT | Performed by: NURSE PRACTITIONER

## 2025-01-23 RX ORDER — FREMANEZUMAB-VFRM 225 MG/1.5ML
225 INJECTION SUBCUTANEOUS
Qty: 1 ADJUSTABLE DOSE PRE-FILLED PEN SYRINGE | Refills: 5 | Status: SHIPPED | OUTPATIENT
Start: 2025-01-23

## 2025-01-23 RX ORDER — RIMEGEPANT SULFATE 75 MG/75MG
75 TABLET, ORALLY DISINTEGRATING ORAL PRN
Qty: 8 TABLET | Refills: 2 | Status: SHIPPED | OUTPATIENT
Start: 2025-01-23

## 2025-01-23 NOTE — PROGRESS NOTES
[facility]  NEUROLOGY CLINIC   [address]   [place] [address]   [phone] Office   [phone] Fax           Date:  25     Name:  ALTA TAMAYO  :  1995  MRN:  [id number]     PCP:  Jessica Mon MD    Alta Tamayo is a 29 y.o. female who was seen by synchronous (real-time) audio-video technology on 2025 for Migraine, Back Pain, and Dizziness    Subjective:   Migraines have not been that bad. The one she had last week was the worst one she has had since last visit. She does continue with Ajovy. Since the beginning of the month, she may have noticed some increase in migraine activity. She is not sure if this is related to the unusually cold weather or because she has changed her diet in an effort to lose weight and is eating less. She has tried the Nurtec which seems to help as long as she gets it in by migraine aura. When she does not, the migraines will still last all day. They typically are right orbital with associated photophobia, phonophobia and nausea.  Typically does not vomit, but she has.  They last for several hours.  In the past she has had episodes of right sided hemibody numbness and weakness with these migraines.  She has diagnosis of hemiplegic migraine.      , she had a syncopal event and went to the ER. Her work up was unrevealing. An EEG was ordered outpatient which was normal. MRI of the brain was essentially normal. No additional events of LOC though she has had some intermittent dizziness which occurs rarely.     Back pain. MRI done through [facility] and the results are still not available. She has been going to physical therapy which has helped. The main time that she notices the discomfort, which is largely now in the hip, is when she is sitting.     Recap from [facility]:  Hemiplegic migraine with occurrences about three times a month on Ajovy. Prior to this,

## 2025-01-24 ENCOUNTER — HOSPITAL ENCOUNTER (OUTPATIENT)
Facility: HOSPITAL | Age: 30
Setting detail: RECURRING SERIES
Discharge: HOME | End: 2025-01-27
Payer: MEDICAID

## 2025-01-24 PROCEDURE — 97110 THERAPEUTIC EXERCISES: CPT

## 2025-01-24 NOTE — PROGRESS NOTES
PHYSICAL THERAPY - DAILY TREATMENT NOTE (updated 3/23)      Date: 2025          Patient Name:  Teresa Sheehan :  1995   Medical   Diagnosis:  Chronic right-sided low back pain with right-sided sciatica [M54.41, G89.29] Treatment Diagnosis:  M54.41  LUMBAGO WITH SCIATICA, RIGHT SIDE    Referral Source:  Chapis Gaines APRN - NP Insurance:   Payor: [facility] MEDICAID / Plan: [facility] CARDINAL CARE / Product Type: *No Product type* /                     Patient  verified yes     Visit #   Current  / Total 9 12   Time   In / Out 8:35 am 9:20   Total Treatment Time 45   Total Timed Codes 40         SUBJECTIVE    Pain Level (0-10 scale): 1    Any medication changes, allergies to medications, adverse drug reactions, diagnosis change, or new procedure performed?: [x] No    [] Yes (see summary sheet for update)  Medications: Verified on Patient Summary List    Subjective functional status/changes:     No new complaints.     OBJECTIVE      Therapeutic Procedures:  Tx Min Billable or 1:1 Min (if diff from Tx Min) Procedure, Rationale, Specifics   40  32872 Therapeutic Exercise (timed):  increase ROM, strength, coordination, balance, and proprioception to improve patient's ability to progress to PLOF and address remaining functional goals. (see flow sheet as applicable)     Details if applicable:       21151 Manual Therapy (timed):  decrease pain and increase tissue extensibility to improve patient's ability to progress to PLOF and address remaining functional goals.  The manual therapy interventions were performed at a separate and distinct time from the therapeutic activities interventions . (see flow sheet as applicable)     Details if applicable:           Details if applicable:           Details if applicable:            Details if applicable:     40     Total Total         [x]  Patient Education billed concurrently with other procedures   [x] Review HEP    [] Progressed/Changed HEP, detail:

## 2025-01-31 ENCOUNTER — HOSPITAL ENCOUNTER (OUTPATIENT)
Facility: HOSPITAL | Age: 30
Setting detail: RECURRING SERIES
End: 2025-01-31
Payer: MEDICAID

## 2025-01-31 PROCEDURE — 97110 THERAPEUTIC EXERCISES: CPT

## 2025-01-31 NOTE — PROGRESS NOTES
PHYSICAL THERAPY - DAILY TREATMENT NOTE (updated 3/23)      Date: 2025          Patient Name:  Teresa Sheehan :  1995   Medical   Diagnosis:  Chronic right-sided low back pain with right-sided sciatica [M54.41, G89.29] Treatment Diagnosis:  M54.41  LUMBAGO WITH SCIATICA, RIGHT SIDE    Referral Source:  Chapis Gaines APRN - NP Insurance:   Payor: [facility] MEDICAID / Plan: [facility] CARDINAL CARE / Product Type: *No Product type* /                     Patient  verified yes     Visit #   Current  / Total 10 12   Time   In / Out 8:35 am 9:20   Total Treatment Time 45   Total Timed Codes 40         SUBJECTIVE    Pain Level (0-10 scale): 2-3    Any medication changes, allergies to medications, adverse drug reactions, diagnosis change, or new procedure performed?: [x] No    [] Yes (see summary sheet for update)  Medications: Verified on Patient Summary List    Subjective functional status/changes:     Weather makes her back a little stiff.    OBJECTIVE      Therapeutic Procedures:  Tx Min Billable or 1:1 Min (if diff from Tx Min) Procedure, Rationale, Specifics   40  61503 Therapeutic Exercise (timed):  increase ROM, strength, coordination, balance, and proprioception to improve patient's ability to progress to PLOF and address remaining functional goals. (see flow sheet as applicable)     Details if applicable:       38567 Manual Therapy (timed):  decrease pain and increase tissue extensibility to improve patient's ability to progress to PLOF and address remaining functional goals.  The manual therapy interventions were performed at a separate and distinct time from the therapeutic activities interventions . (see flow sheet as applicable)     Details if applicable:           Details if applicable:           Details if applicable:            Details if applicable:     40     Total Total         [x]  Patient Education billed concurrently with other procedures   [x] Review HEP    []

## 2025-02-10 ENCOUNTER — PATIENT MESSAGE (OUTPATIENT)
Age: 30
End: 2025-02-10

## 2025-02-10 DIAGNOSIS — G43.411 HEMIPLEGIC MIGRAINE, INTRACTABLE, WITH STATUS MIGRAINOSUS: ICD-10-CM

## 2025-02-10 RX ORDER — FREMANEZUMAB-VFRM 225 MG/1.5ML
225 INJECTION SUBCUTANEOUS
Qty: 1 ADJUSTABLE DOSE PRE-FILLED PEN SYRINGE | Refills: 5 | Status: ACTIVE | OUTPATIENT
Start: 2025-02-10 | End: 2025-02-13 | Stop reason: SDUPTHER

## 2025-02-13 RX ORDER — FREMANEZUMAB-VFRM 225 MG/1.5ML
225 INJECTION SUBCUTANEOUS
Qty: 3 ADJUSTABLE DOSE PRE-FILLED PEN SYRINGE | Refills: 2 | Status: ACTIVE | OUTPATIENT
Start: 2025-02-13

## 2025-05-29 ENCOUNTER — TELEMEDICINE (OUTPATIENT)
Age: 30
End: 2025-05-29
Payer: COMMERCIAL

## 2025-05-29 DIAGNOSIS — M54.41 CHRONIC RIGHT-SIDED LOW BACK PAIN WITH RIGHT-SIDED SCIATICA: ICD-10-CM

## 2025-05-29 DIAGNOSIS — G43.411 HEMIPLEGIC MIGRAINE, INTRACTABLE, WITH STATUS MIGRAINOSUS: Primary | ICD-10-CM

## 2025-05-29 DIAGNOSIS — R55 SYNCOPE, UNSPECIFIED SYNCOPE TYPE: ICD-10-CM

## 2025-05-29 DIAGNOSIS — G89.29 CHRONIC RIGHT-SIDED LOW BACK PAIN WITH RIGHT-SIDED SCIATICA: ICD-10-CM

## 2025-05-29 PROCEDURE — 99214 OFFICE O/P EST MOD 30 MIN: CPT | Performed by: NURSE PRACTITIONER

## 2025-05-29 RX ORDER — FREMANEZUMAB-VFRM 225 MG/1.5ML
225 INJECTION SUBCUTANEOUS
Qty: 3 ADJUSTABLE DOSE PRE-FILLED PEN SYRINGE | Refills: 2 | Status: ACTIVE | OUTPATIENT
Start: 2025-05-29

## 2025-05-29 RX ORDER — RIMEGEPANT SULFATE 75 MG/75MG
75 TABLET, ORALLY DISINTEGRATING ORAL PRN
Qty: 8 TABLET | Refills: 5 | Status: ACTIVE | OUTPATIENT
Start: 2025-05-29

## 2025-05-29 NOTE — PROGRESS NOTES
[facility]  NEUROLOGY CLINIC   [address]   [place] [address]   [phone] Office   [phone] Fax           Date:  25     Name:  ALTA TAMAYO  :  1995  MRN:  [id number]     PCP:  Jessica Mon MD    Alta Tamayo is a 30 y.o. female who was seen by synchronous (real-time) audio-video technology on 2025 for Migraine, Back Pain, and syncope    Subjective:   Migraines have been doing well. She has 2-3 per month since starting Ajovy and the Nurtec works for rescue. Unusually cold weather or not eating may trigger them. They typically are right orbital with associated photophobia, phonophobia and nausea.  Typically does not vomit, but she has.  They last for several hours.  In the past she has had episodes of right sided hemibody numbness and weakness with these migraines.  She has diagnosis of hemiplegic migraine.      , she had a syncopal event and went to the ER. Her work up was unrevealing. An EEG was ordered outpatient which was normal. MRI of the brain was essentially normal. No additional events of LOC.    Back pain. MRI done through [facility] and the results are still not available. She did go to physical therapy which has helped. She does still do the HEP but sporadically.     Recap from LV:  Hemiplegic migraine with occurrences about three times a month on Ajovy. Prior to this, she was having migraines >15 days per month. She has had trials of Emgality with loss of effectiveness over time, Topamax prescribed by Dr. Eckert which was ineffective, and Elavil caused too much sedation the following day. Will continue with Ajovy for prevention. Due to hemiplegic migraine, triptan medication and DHE are contraindicated. Will continue with [facility] for acute management.      Syncope of unknown etiology. Will continue to monitor.       She continues to have lower back pain with some